# Patient Record
Sex: FEMALE | Race: WHITE | HISPANIC OR LATINO | Employment: FULL TIME | URBAN - METROPOLITAN AREA
[De-identification: names, ages, dates, MRNs, and addresses within clinical notes are randomized per-mention and may not be internally consistent; named-entity substitution may affect disease eponyms.]

---
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
## 2020-02-09 DIAGNOSIS — F32.A ANXIETY AND DEPRESSION: ICD-10-CM

## 2020-02-09 DIAGNOSIS — F41.9 ANXIETY AND DEPRESSION: ICD-10-CM

## 2020-02-19 ENCOUNTER — PATIENT MESSAGE (OUTPATIENT)
Dept: MEDICAL GROUP | Facility: MEDICAL CENTER | Age: 20
End: 2020-02-19

## 2020-03-18 ENCOUNTER — OFFICE VISIT (OUTPATIENT)
Dept: MEDICAL GROUP | Facility: MEDICAL CENTER | Age: 20
End: 2020-03-18
Payer: COMMERCIAL

## 2020-03-18 VITALS
BODY MASS INDEX: 29.86 KG/M2 | SYSTOLIC BLOOD PRESSURE: 112 MMHG | HEIGHT: 62 IN | HEART RATE: 93 BPM | OXYGEN SATURATION: 97 % | TEMPERATURE: 97.6 F | WEIGHT: 162.26 LBS | DIASTOLIC BLOOD PRESSURE: 76 MMHG

## 2020-03-18 DIAGNOSIS — F32.A ANXIETY AND DEPRESSION: ICD-10-CM

## 2020-03-18 DIAGNOSIS — F41.9 ANXIETY AND DEPRESSION: ICD-10-CM

## 2020-03-18 DIAGNOSIS — N92.1 MENORRHAGIA WITH IRREGULAR CYCLE: ICD-10-CM

## 2020-03-18 PROBLEM — F41.1 GAD (GENERALIZED ANXIETY DISORDER): Status: ACTIVE | Noted: 2017-12-18

## 2020-03-18 PROCEDURE — 99214 OFFICE O/P EST MOD 30 MIN: CPT | Performed by: NURSE PRACTITIONER

## 2020-03-18 RX ORDER — CIPROFLOXACIN AND DEXAMETHASONE 3; 1 MG/ML; MG/ML
4 SUSPENSION/ DROPS AURICULAR (OTIC)
COMMUNITY
Start: 2011-08-14 | End: 2020-03-18

## 2020-03-18 RX ORDER — NORGESTIMATE AND ETHINYL ESTRADIOL 0.25-0.035
1 KIT ORAL DAILY
Qty: 84 TAB | Refills: 3 | Status: SHIPPED | OUTPATIENT
Start: 2020-03-18 | End: 2021-02-25

## 2020-03-18 RX ORDER — AMOXICILLIN 875 MG/1
TABLET, COATED ORAL
COMMUNITY
Start: 2020-01-08 | End: 2020-03-18

## 2020-03-18 ASSESSMENT — ANXIETY QUESTIONNAIRES
7. FEELING AFRAID AS IF SOMETHING AWFUL MIGHT HAPPEN: NEARLY EVERY DAY
1. FEELING NERVOUS, ANXIOUS, OR ON EDGE: NEARLY EVERY DAY
5. BEING SO RESTLESS THAT IT IS HARD TO SIT STILL: NEARLY EVERY DAY
3. WORRYING TOO MUCH ABOUT DIFFERENT THINGS: NEARLY EVERY DAY
4. TROUBLE RELAXING: MORE THAN HALF THE DAYS
2. NOT BEING ABLE TO STOP OR CONTROL WORRYING: NEARLY EVERY DAY
GAD7 TOTAL SCORE: 19
6. BECOMING EASILY ANNOYED OR IRRITABLE: MORE THAN HALF THE DAYS

## 2020-03-18 ASSESSMENT — PATIENT HEALTH QUESTIONNAIRE - PHQ9
SUM OF ALL RESPONSES TO PHQ QUESTIONS 1-9: 13
5. POOR APPETITE OR OVEREATING: 2 - MORE THAN HALF THE DAYS
CLINICAL INTERPRETATION OF PHQ2 SCORE: 2

## 2020-03-18 ASSESSMENT — FIBROSIS 4 INDEX: FIB4 SCORE: 0.34

## 2020-03-19 ENCOUNTER — PATIENT MESSAGE (OUTPATIENT)
Dept: MEDICAL GROUP | Facility: MEDICAL CENTER | Age: 20
End: 2020-03-19

## 2020-03-19 NOTE — ASSESSMENT & PLAN NOTE
"Patient reports that overall she has had some improvement in mood and anxiety level since having started Zoloft, but still having several days a week where she is struggling with anxiety and inability to relax, feeling overwhelmed.  She has just recently increased her Zoloft dose to 100 mg daily, it will take more time for this to have full effect.  She questions if she should be switched to \"an anxiety medication\" rather than an antidepressant.  We have discussed that SSRIs are shown to work for both anxiety and depression and are the preferred course of treatment.  She does have prescription for hydroxyzine which she uses on occasion, she does find that this reduces her acute anxiety but tends to make her drowsy.  She is continuing to see her therapist.  She is currently feeling overwhelmed that her classes have now all been switched to online since the outbreak of Covid-19 virus  PRISCILA-7 Questionnaire    Feeling nervous, anxious, or on edge: Nearly every day  Not being able to sop or control worrying: Nearly every day  Worrying too much about different things: Nearly every day  Trouble relaxing: More than half the days  Being so restless that it's hard to sit still: Nearly every day  Becoming easily annoyed or irritable: More than half the days  Feeling afraid as if something awful might happen: Nearly every day  Total: 19    Interpretation of PRISCILA 7 Total Score   Score Severity :  0-4 No Anxiety   5-9 Mild Anxiety  10-14 Moderate Anxiety  15-21 Severe Anxiety    Depression Screening    Little interest or pleasure in doing things?  1 - several days   Feeling down, depressed , or hopeless? 1 - several days   Trouble falling or staying asleep, or sleeping too much?  2 - more than half the days   Feeling tired or having little energy?  1 - several days   Poor appetite or overeating?  2 - more than half the days   Feeling bad about yourself - or that you are a failure or have let yourself or your family down? 1 - " several days   Trouble concentrating on things, such as reading the newspaper or watching television? 2 - more than half the days   Moving or speaking so slowly that other people could have noticed.  Or the opposite - being so fidgety or restless that you have been moving around a lot more than usual?  3 - nearly every day   Thoughts that you would be better off dead, or of hurting yourself?  0 - not at all   Patient Health Questionnaire Score: 13       If depressive symptoms identified deferred to follow up visit unless specifically addressed in assesment and plan.    Interpretation of PHQ-9 Total Score   Score Severity   1-4 No Depression   5-9 Mild Depression   10-14 Moderate Depression   15-19 Moderately Severe Depression   20-27 Severe Depression

## 2020-03-19 NOTE — ASSESSMENT & PLAN NOTE
She has been managing with Depo-Provera over the last year, feels that she is having several side effects related to the medication and has decided to discontinue.  Her dose was due last month and she skipped this.  She felt that it was causing significant weight gain and contributing to mood swings.  She would like to try an alternative birth control for period regulation.  She is not currently sexually active.  She has has had some light spotting since having discontinued Depo-Provera.  No history of DVT, clotting disorder, hypertension.  No tobacco use

## 2020-03-19 NOTE — PROGRESS NOTES
"Subjective:     Chief Complaint   Patient presents with   • Medication Management     Pt would like to switch from zoloft to an anxiolytic     Jacquie Shawna Friedman is a 19 y.o. female here today to follow up on:    Anxiety and depression  Patient reports that overall she has had some improvement in mood and anxiety level since having started Zoloft, but still having several days a week where she is struggling with anxiety and inability to relax, feeling overwhelmed.  She has just recently increased her Zoloft dose to 100 mg daily, it will take more time for this to have full effect.  She questions if she should be switched to \"an anxiety medication\" rather than an antidepressant.  We have discussed that SSRIs are shown to work for both anxiety and depression and are the preferred course of treatment.  She does have prescription for hydroxyzine which she uses on occasion, she does find that this reduces her acute anxiety but tends to make her drowsy.  She is continuing to see her therapist.  She is currently feeling overwhelmed that her classes have now all been switched to online since the outbreak of Covid-19 virus  PRISCILA-7 Questionnaire    Feeling nervous, anxious, or on edge: Nearly every day  Not being able to sop or control worrying: Nearly every day  Worrying too much about different things: Nearly every day  Trouble relaxing: More than half the days  Being so restless that it's hard to sit still: Nearly every day  Becoming easily annoyed or irritable: More than half the days  Feeling afraid as if something awful might happen: Nearly every day  Total: 19    Interpretation of PRISCILA 7 Total Score   Score Severity :  0-4 No Anxiety   5-9 Mild Anxiety  10-14 Moderate Anxiety  15-21 Severe Anxiety    Depression Screening    Little interest or pleasure in doing things?  1 - several days   Feeling down, depressed , or hopeless? 1 - several days   Trouble falling or staying asleep, or sleeping too much?  2 - more than " half the days   Feeling tired or having little energy?  1 - several days   Poor appetite or overeating?  2 - more than half the days   Feeling bad about yourself - or that you are a failure or have let yourself or your family down? 1 - several days   Trouble concentrating on things, such as reading the newspaper or watching television? 2 - more than half the days   Moving or speaking so slowly that other people could have noticed.  Or the opposite - being so fidgety or restless that you have been moving around a lot more than usual?  3 - nearly every day   Thoughts that you would be better off dead, or of hurting yourself?  0 - not at all   Patient Health Questionnaire Score: 13       If depressive symptoms identified deferred to follow up visit unless specifically addressed in assesment and plan.    Interpretation of PHQ-9 Total Score   Score Severity   1-4 No Depression   5-9 Mild Depression   10-14 Moderate Depression   15-19 Moderately Severe Depression   20-27 Severe Depression        Menorrhagia with irregular cycle  She has been managing with Depo-Provera over the last year, feels that she is having several side effects related to the medication and has decided to discontinue.  Her dose was due last month and she skipped this.  She felt that it was causing significant weight gain and contributing to mood swings.  She would like to try an alternative birth control for period regulation.  She is not currently sexually active.  She has has had some light spotting since having discontinued Depo-Provera.  No history of DVT, clotting disorder, hypertension.  No tobacco use       Current medicines (including changes today)  Current Outpatient Medications   Medication Sig Dispense Refill   • norgestimate-ethinyl estradiol (ORTHO-CYCLEN) 0.25-35 MG-MCG per tablet Take 1 Tab by mouth every day. 84 Tab 3   • sertraline (ZOLOFT) 50 MG Tab TAKE 1 TABLET BY MOUTH ONCE DAILY 90 Tab 1   • hydrOXYzine HCl (ATARAX) 25 MG Tab  "Take 1 Tab by mouth every 8 hours as needed for Anxiety (No more than 2 tabs in 24 hrs). 30 Tab 0     No current facility-administered medications for this visit.      She  has a past medical history of Ankle fracture, right and Healthy pediatric patient.    ROS included above     Objective:     /76   Pulse 93   Temp 36.4 °C (97.6 °F)   Ht 1.575 m (5' 2\")   Wt 73.6 kg (162 lb 4.1 oz)   SpO2 97%  Body mass index is 29.68 kg/m².     Physical Exam:  General: Alert, oriented in no acute distress.  Eye contact is good, speech is normal, affect calm  Lungs: clear to auscultation bilaterally, normal effort, no wheeze/ rhonchi/ rales.  CV: regular rate and rhythm, S1, S2, no murmur  Abdomen: soft, nontender  Ext: no edema, color normal, vascularity normal, temperature normal    Assessment and Plan:   The following treatment plan was discussed  1. Anxiety and depression   uncontrolled.  She has recently increased her dose of Zoloft which may take another 1 to 2 weeks to have full effect.  We have discussed that SSRIs are preferred treatment for anxiety.  She will continue with current plan and let me know how she is feeling an additional week or 2 at which point we can further adjust if needed.  Lifestyle factors including regular exercise, healthy diet, good sleep patterns reviewed.  Encouraged to continue with counseling   2. Menorrhagia with irregular cycle   she has discontinued Depo-Provera and would like to switch to oral contraceptive.  We will switch to Ortho-Cyclen  We discussed that birth control pills can increase risk of blood clots, liver tumors, gallbladder disease, and stroke. Side effects can include headache, constipation nausea. They do not protect against STDs. They are not effective if not taken everyday, if more than one pill is missed a backup method should be used. Antibiotics can make the pill less effective, she takes antibiotic a backup method should be used. Any unusual headache leg pain " chest pain shortness of breath difficulty speaking or moving should be addressed immediately.           Followup: 1-2 weeks regarding mood         Please note that this dictation was created using voice recognition software. I have worked with consultants from the vendor as well as technical experts from [employer] to optimize the interface. I have made every reasonable attempt to correct obvious errors, but I expect that there are errors of grammar and possibly content that I did not discover before finalizing the note.

## 2020-03-31 ENCOUNTER — PATIENT MESSAGE (OUTPATIENT)
Dept: MEDICAL GROUP | Facility: MEDICAL CENTER | Age: 20
End: 2020-03-31

## 2020-04-01 NOTE — TELEPHONE ENCOUNTER
From: Jacquie Friedman  To: JORDY De  Sent: 3/31/2020 3:49 PM PDT  Subject: checking in    I am taking the birth control and the Zoloft after dinner so around 7.       ----- Message -----   From:JORDY De   Sent:3/31/2020 1:34 PM PDT   To:Jacquie Friedman   Subject:RE: checking in    Could be related to either of the medications. Be sure that you are taking it with food in your stomach. Are you doing the birth control in the morning or evening? Some people do better taking it before bed.      ----- Message -----   From:Jacquie Friedman   Sent:3/31/2020 1:22 PM PDT   To:JORDY De   Subject:RE: checking in    Hello,  I feel better and I think this is the right dosage for me right now. Everything is going well. The only difference that I have noticed is that I have been getting nauseous very easily which I think might be due to the birth control. Any input?   Thank you       ----- Message -----   From:JORDY De   Sent:3/31/2020 1:05 PM PDT   To:Jacquie Friedman   Subject:checking in    [facility],  I just wanted to check in with you and see you had your medications working for the anxiety? Have you noticed any additional benefit in the last week or 2?  Mariza JUDD

## 2020-04-03 ENCOUNTER — PATIENT MESSAGE (OUTPATIENT)
Dept: MEDICAL GROUP | Facility: MEDICAL CENTER | Age: 20
End: 2020-04-03

## 2020-04-05 RX ORDER — SERTRALINE HYDROCHLORIDE 100 MG/1
100 TABLET, FILM COATED ORAL DAILY
Qty: 30 TAB | Refills: 11 | Status: SHIPPED | OUTPATIENT
Start: 2020-04-05 | End: 2021-02-02

## 2020-04-05 NOTE — TELEPHONE ENCOUNTER
From: Jacquie Friedman  To: JORDY De  Sent: 4/3/2020 10:09 PM PDT  Subject: Prescription Question    Hello,   Can you send in a prescription for the Zoloft for 100mg? I have a few left of the current 50mg.   Thank you

## 2020-04-09 ENCOUNTER — PATIENT MESSAGE (OUTPATIENT)
Dept: MEDICAL GROUP | Facility: MEDICAL CENTER | Age: 20
End: 2020-04-09

## 2020-04-09 RX ORDER — AZELASTINE HYDROCHLORIDE 0.5 MG/ML
1 SOLUTION/ DROPS OPHTHALMIC 2 TIMES DAILY
Qty: 6 ML | Refills: 2 | Status: SHIPPED | OUTPATIENT
Start: 2020-04-09 | End: 2021-04-01

## 2020-07-21 ENCOUNTER — PATIENT MESSAGE (OUTPATIENT)
Dept: MEDICAL GROUP | Facility: MEDICAL CENTER | Age: 20
End: 2020-07-21

## 2020-07-22 ENCOUNTER — PATIENT MESSAGE (OUTPATIENT)
Dept: MEDICAL GROUP | Facility: MEDICAL CENTER | Age: 20
End: 2020-07-22

## 2020-07-22 NOTE — TELEPHONE ENCOUNTER
From: Jacquie Friedman  To: JORDY De  Sent: 7/21/2020 8:29 PM PDT  Subject: Non-Urgent Medical Question    Hello,  I was wondering what the soonest time would be that I could come in for an appointment. I've hit a really low point in my life and I talked to my therapist and we were thinking that maybe the best option for me is to switch medications and try something new.   Thank you

## 2020-07-22 NOTE — TELEPHONE ENCOUNTER
From: Jacquie Friedman  To: JORDY De  Sent: 7/22/2020 8:10 AM PDT  Subject: Non-Urgent Medical Question    Hi,   If I can come in at 4 that would be great. Thank you.       ----- Message -----   From:JORDY De   Sent:7/22/2020 7:24 AM PDT   To:Jacquie Friedman   Subject:RE: Non-Urgent Medical Question    Hi Jacquie, I have time to see this afternoon if you can make it in. Or we can do it by virtual (video) call. Either 240 or 4:00 works for me. Let me know.      ----- Message -----   From:Jacquie Friedman   Sent:7/21/2020 8:29 PM PDT   To:JORDY De   Subject:Non-Urgent Medical Question    Hello,  I was wondering what the soonest time would be that I could come in for an appointment. I've hit a really low point in my life and I talked to my therapist and we were thinking that maybe the best option for me is to switch medications and try something new.   Thank you

## 2020-12-17 ENCOUNTER — PATIENT MESSAGE (OUTPATIENT)
Dept: MEDICAL GROUP | Facility: MEDICAL CENTER | Age: 20
End: 2020-12-17

## 2020-12-17 DIAGNOSIS — B34.9 VIRAL ILLNESS: ICD-10-CM

## 2020-12-19 ENCOUNTER — HOSPITAL ENCOUNTER (OUTPATIENT)
Dept: LAB | Facility: MEDICAL CENTER | Age: 20
End: 2020-12-19
Attending: NURSE PRACTITIONER
Payer: COMMERCIAL

## 2020-12-19 PROCEDURE — U0003 INFECTIOUS AGENT DETECTION BY NUCLEIC ACID (DNA OR RNA); SEVERE ACUTE RESPIRATORY SYNDROME CORONAVIRUS 2 (SARS-COV-2) (CORONAVIRUS DISEASE [COVID-19]), AMPLIFIED PROBE TECHNIQUE, MAKING USE OF HIGH THROUGHPUT TECHNOLOGIES AS DESCRIBED BY CMS-2020-01-R: HCPCS

## 2020-12-19 PROCEDURE — C9803 HOPD COVID-19 SPEC COLLECT: HCPCS

## 2020-12-20 LAB
COVID ORDER STATUS COVID19: NORMAL
SARS-COV-2 RNA RESP QL NAA+PROBE: NOTDETECTED
SPECIMEN SOURCE: NORMAL

## 2020-12-21 ENCOUNTER — PATIENT MESSAGE (OUTPATIENT)
Dept: MEDICAL GROUP | Facility: MEDICAL CENTER | Age: 20
End: 2020-12-21

## 2021-02-02 ENCOUNTER — PATIENT MESSAGE (OUTPATIENT)
Dept: MEDICAL GROUP | Facility: MEDICAL CENTER | Age: 21
End: 2021-02-02

## 2021-02-02 RX ORDER — FLUOXETINE HYDROCHLORIDE 20 MG/1
20 CAPSULE ORAL DAILY
Qty: 30 CAP | Refills: 5 | Status: SHIPPED | OUTPATIENT
Start: 2021-02-02 | End: 2021-08-09

## 2021-02-03 ENCOUNTER — PATIENT MESSAGE (OUTPATIENT)
Dept: MEDICAL GROUP | Facility: MEDICAL CENTER | Age: 21
End: 2021-02-03

## 2021-02-03 NOTE — TELEPHONE ENCOUNTER
From: Jacquie Friedman  To: JORDY De  Sent: 2/3/2021 7:48 AM PST  Subject: Prescription Question    Yes it's working really well!       ----- Message -----   From:JORDY De   Sent:2/2/2021 7:58 PM PST   To:Jacquie Friedman   Subject:RE: Prescription Question    Yes, I can refill this for you. Is it working well?  Mariza JUDD      ----- Message -----   From:Jacquie Friedman   Sent:2/2/2021 6:07 PM PST   To:JORDY De   Subject:Prescription Question    Hello,     I went to a psychiatrist and he prescribed me Prozac (Fluoxetine) 20mg. However he left the office where I would go and I'm low on my medicine. Would you be able to send in a prescription to the pharmacy please?     Thank you,   Jacquie Friedman

## 2021-02-03 NOTE — TELEPHONE ENCOUNTER
From: Jacquie Friedman  To: JORDY De  Sent: 2/2/2021 6:07 PM PST  Subject: Prescription Question    Hello,     I went to a psychiatrist and he prescribed me Prozac (Fluoxetine) 20mg. However he left the office where I would go and I'm low on my medicine. Would you be able to send in a prescription to the pharmacy please?     Thank you,   Jacquie Friedman

## 2021-02-24 ENCOUNTER — PATIENT MESSAGE (OUTPATIENT)
Dept: MEDICAL GROUP | Facility: MEDICAL CENTER | Age: 21
End: 2021-02-24

## 2021-02-25 ENCOUNTER — PATIENT MESSAGE (OUTPATIENT)
Dept: MEDICAL GROUP | Facility: MEDICAL CENTER | Age: 21
End: 2021-02-25

## 2021-02-25 ENCOUNTER — OFFICE VISIT (OUTPATIENT)
Dept: MEDICAL GROUP | Facility: MEDICAL CENTER | Age: 21
End: 2021-02-25
Payer: COMMERCIAL

## 2021-02-25 VITALS
SYSTOLIC BLOOD PRESSURE: 110 MMHG | WEIGHT: 148.59 LBS | DIASTOLIC BLOOD PRESSURE: 70 MMHG | HEIGHT: 62 IN | TEMPERATURE: 98.4 F | HEART RATE: 77 BPM | BODY MASS INDEX: 27.34 KG/M2 | OXYGEN SATURATION: 95 % | RESPIRATION RATE: 20 BRPM

## 2021-02-25 DIAGNOSIS — B34.9 VIRAL ILLNESS: ICD-10-CM

## 2021-02-25 DIAGNOSIS — J02.9 SORE THROAT: ICD-10-CM

## 2021-02-25 DIAGNOSIS — J02.9 EXUDATIVE PHARYNGITIS: ICD-10-CM

## 2021-02-25 LAB
INT CON NEG: NEGATIVE
INT CON POS: POSITIVE
S PYO AG THROAT QL: NORMAL

## 2021-02-25 PROCEDURE — 99213 OFFICE O/P EST LOW 20 MIN: CPT | Performed by: NURSE PRACTITIONER

## 2021-02-25 PROCEDURE — 87880 STREP A ASSAY W/OPTIC: CPT | Performed by: NURSE PRACTITIONER

## 2021-02-25 RX ORDER — AMOXICILLIN 500 MG/1
1000 CAPSULE ORAL DAILY
Qty: 20 CAPSULE | Refills: 0 | Status: SHIPPED | OUTPATIENT
Start: 2021-02-25 | End: 2021-04-01

## 2021-02-25 NOTE — PROGRESS NOTES
"Subjective:     Chief Complaint   Patient presents with   • Advice Only     inflammed tonsil (L) since yest     Jacquie Friedman is a 20 y.o. female here for evaluation of sore throat.  This started 2 days ago, primarily on the left side.  She has had a white patch visible on her tonsil and feels that her lymph nodes are swollen on the site as well.  She has some pressure in her ears bilaterally.  She has recently been around a friend who was diagnosed with strep pharyngitis.  She has been taking Tylenol and doing salt water gargles at home with minimal relief  No fever, chills, congestion, rash, nausea.  No known exposure to COVID-19.  No cough        Current medicines (including changes today)  Current Outpatient Medications   Medication Sig Dispense Refill   • amoxicillin (AMOXIL) 500 MG Cap Take 2 Capsules by mouth every day. 20 capsule 0   • FLUoxetine (PROZAC) 20 MG Cap Take 1 Cap by mouth every day. 30 Cap 5   • azelastine (OPTIVAR) 0.05 % ophthalmic solution Place 1 Drop in both eyes 2 times a day. 6 mL 2   • hydrOXYzine HCl (ATARAX) 25 MG Tab Take 1 Tab by mouth every 8 hours as needed for Anxiety (No more than 2 tabs in 24 hrs). 30 Tab 0     No current facility-administered medications for this visit.     She  has a past medical history of Ankle fracture, right and Healthy pediatric patient.    ROS included above     Objective:     /70 (BP Location: Left arm, Patient Position: Sitting, BP Cuff Size: Adult)   Pulse 77   Temp 36.9 °C (98.4 °F)   Resp 20   Ht 1.575 m (5' 2\")   Wt 67.4 kg (148 lb 9.4 oz)   SpO2 95%  Body mass index is 27.18 kg/m².     Physical Exam:  General: Alert, oriented in no acute distress.  Eye contact is good, speech is normal, affect calm  HEENT: Posterior oropharynx with erythema, exudate present, tonsil moderately enlarged primarily on the left.  Mild tonsillar lymphadenopathy, mobile, nontender.  Nares clear  Lungs: clear to auscultation bilaterally, normal effort, " no wheeze/ rhonchi/ rales.  CV: regular rate and rhythm, S1, S2, no murmur  Ext: no edema, color normal, vascularity normal, temperature normal    Assessment and Plan:   The following treatment plan was discussed   1. Exudative pharyngitis   rapid strep is negative in the office today but she does have visible exudate and I am concerned this may be a false reading.  Ideally I would like to send a throat culture but I do not have a swab available at this time.  I will go ahead and treat her with amoxicillin.  Advised to complete entire antibiotic course.  We have discussed that antibiotics can cause diarrhea and reviewed dietary recommendations for this.  May continue with Tylenol or ibuprofen as well as salt water gargles as needed.  Follow-up if not gradually resolving   2. Sore throat  POCT Rapid Strep A    amoxicillin (AMOXIL) 500 MG Cap       Followup: as needed    Please note that this dictation was created using voice recognition software. I have worked with consultants from the vendor as well as technical experts from [facility] to optimize the interface. I have made every reasonable attempt to correct obvious errors, but I expect that there are errors of grammar and possibly content that I did not discover before finalizing the note.

## 2021-02-26 ENCOUNTER — HOSPITAL ENCOUNTER (OUTPATIENT)
Dept: LAB | Facility: MEDICAL CENTER | Age: 21
End: 2021-02-26
Attending: NURSE PRACTITIONER
Payer: COMMERCIAL

## 2021-02-26 DIAGNOSIS — B34.9 VIRAL ILLNESS: ICD-10-CM

## 2021-02-26 PROCEDURE — U0005 INFEC AGEN DETEC AMPLI PROBE: HCPCS

## 2021-02-26 PROCEDURE — C9803 HOPD COVID-19 SPEC COLLECT: HCPCS

## 2021-02-26 PROCEDURE — U0003 INFECTIOUS AGENT DETECTION BY NUCLEIC ACID (DNA OR RNA); SEVERE ACUTE RESPIRATORY SYNDROME CORONAVIRUS 2 (SARS-COV-2) (CORONAVIRUS DISEASE [COVID-19]), AMPLIFIED PROBE TECHNIQUE, MAKING USE OF HIGH THROUGHPUT TECHNOLOGIES AS DESCRIBED BY CMS-2020-01-R: HCPCS

## 2021-03-22 ENCOUNTER — OFFICE VISIT (OUTPATIENT)
Dept: MEDICAL GROUP | Facility: MEDICAL CENTER | Age: 21
End: 2021-03-22
Payer: COMMERCIAL

## 2021-03-22 VITALS
TEMPERATURE: 98.3 F | HEART RATE: 93 BPM | HEIGHT: 62 IN | DIASTOLIC BLOOD PRESSURE: 78 MMHG | BODY MASS INDEX: 27.23 KG/M2 | RESPIRATION RATE: 20 BRPM | SYSTOLIC BLOOD PRESSURE: 118 MMHG | OXYGEN SATURATION: 93 % | WEIGHT: 148 LBS

## 2021-03-22 DIAGNOSIS — Z13.0 SCREENING, ANEMIA, DEFICIENCY, IRON: ICD-10-CM

## 2021-03-22 DIAGNOSIS — Z13.29 THYROID DISORDER SCREENING: ICD-10-CM

## 2021-03-22 DIAGNOSIS — Z13.220 LIPID SCREENING: ICD-10-CM

## 2021-03-22 DIAGNOSIS — Z11.1 SCREENING-PULMONARY TB: ICD-10-CM

## 2021-03-22 DIAGNOSIS — F32.1 MODERATE SINGLE CURRENT EPISODE OF MAJOR DEPRESSIVE DISORDER (HCC): ICD-10-CM

## 2021-03-22 DIAGNOSIS — Z13.21 ENCOUNTER FOR VITAMIN DEFICIENCY SCREENING: ICD-10-CM

## 2021-03-22 DIAGNOSIS — Z00.00 ANNUAL PHYSICAL EXAM: ICD-10-CM

## 2021-03-22 DIAGNOSIS — Z23 NEED FOR VACCINATION: ICD-10-CM

## 2021-03-22 PROCEDURE — 90715 TDAP VACCINE 7 YRS/> IM: CPT | Performed by: NURSE PRACTITIONER

## 2021-03-22 PROCEDURE — 90471 IMMUNIZATION ADMIN: CPT | Performed by: NURSE PRACTITIONER

## 2021-03-22 PROCEDURE — 99395 PREV VISIT EST AGE 18-39: CPT | Mod: 25 | Performed by: NURSE PRACTITIONER

## 2021-03-22 ASSESSMENT — LIFESTYLE VARIABLES
DURING THE PAST 12 MONTHS, ON HOW MANY DAYS DID YOU USE ANYTHING ELSE TO GET HIGH: 0
PART A TOTAL SCORE: 0
HAVE YOU EVER RIDDEN IN A CAR DRIVEN BY SOMEONE WHO WAS HIGH OR HAD BEEN USING ALCOHOL OR DRUGS: NO
DURING THE PAST 12 MONTHS, ON HOW MANY DAYS DID YOU USE ANY TOBACCO OR NICOTINE PRODUCTS: 0
DURING THE PAST 12 MONTHS, ON HOW MANY DAYS DID YOU USE ANY MARIJUANA: 0
DURING THE PAST 12 MONTHS, ON HOW MANY DAYS DID YOU DRINK MORE THAN A FEW SIPS OF BEER, WINE, OR ANY DRINK CONTAINING ALCOHOL: 0

## 2021-03-22 ASSESSMENT — ANXIETY QUESTIONNAIRES
GAD7 TOTAL SCORE: 6
5. BEING SO RESTLESS THAT IT IS HARD TO SIT STILL: SEVERAL DAYS
6. BECOMING EASILY ANNOYED OR IRRITABLE: NOT AT ALL
4. TROUBLE RELAXING: SEVERAL DAYS
3. WORRYING TOO MUCH ABOUT DIFFERENT THINGS: SEVERAL DAYS
1. FEELING NERVOUS, ANXIOUS, OR ON EDGE: SEVERAL DAYS
2. NOT BEING ABLE TO STOP OR CONTROL WORRYING: SEVERAL DAYS
7. FEELING AFRAID AS IF SOMETHING AWFUL MIGHT HAPPEN: SEVERAL DAYS

## 2021-03-22 ASSESSMENT — PATIENT HEALTH QUESTIONNAIRE - PHQ9: CLINICAL INTERPRETATION OF PHQ2 SCORE: 0

## 2021-03-22 NOTE — NON-PROVIDER
"Jacquie is a 20 year old female here for her annual and the following needs:      Annual physical exam  Jacquie is a 20 year old female her for her annual exam  She is doing well. Follows a healthy diet and exercises at least 4x/week  She does not smoke or drink alcohol  She is sexually active with a male partner for a few months.   No concerns with STDs at this time. Her and her partner got tested prior to starting intercourse.   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   She feels this dosage is appropriate in controlling her symptoms   She expressed feeling \"great\" since taking it  Denies any SE with this medication    Depression Screening  Little interest or pleasure in doing things?  0 - not at all  Feeling down, depressed , or hopeless? 0 - not at all  Patient Health Questionnaire Score: 0    Interpretation of PHQ-9 Total Score   Score Severity   1-4 Minimal Depression   5-9 Mild Depression   10-14 Moderate Depression   15-19 Moderately Severe Depression   20-27 Severe Depression    PRISCILA-7 Questionnaire    Feeling nervous, anxious, or on edge: Several days  Not being able to sop or control worrying: Several days  Worrying too much about different things: Several days  Trouble relaxing: Several days  Being so restless that it's hard to sit still: Several days  Becoming easily annoyed or irritable: Not at all  Feeling afraid as if something awful might happen: Several days  Total: 6    Interpretation of PRISCILA 7 Total Score   Score Severity :  0-4 No Anxiety   5-9 Mild Anxiety  10-14 Moderate Anxiety  15-21 Severe Anxiety      1. Annual physical exam  I have placed the following orders for screening including the Quantiferon for nursing school.   - TSH WITH REFLEX TO FT4; Future  - CBC WITH " DIFFERENTIAL; Future  - Quantiferon Gold TB (PPD); Future  - Comp Metabolic Panel; Future  - Lipid Profile; Future  - VITAMIN D,25 HYDROXY; Future    Moderate single current episode of major depressive disorder (HCC):  Taking Prozac 20 mg. No change on medication today.   Pt expressed feeling well and her symptoms are well controlled with this dosage.     2. Thyroid disorder screening  I have placed the following order for screening.   - TSH WITH REFLEX TO FT4; Future    3. Encounter for vitamin deficiency screening  I have placed the following order for screening  - VITAMIN D,25 HYDROXY; Future    4. Screening-pulmonary TB  I have placed the following order for screening  - Quantiferon Gold TB (PPD); Future    5. Lipid screening  I have placed the following order for screening   - Lipid Profile; Future    6. Screening, anemia, deficiency, iron  I have placed the following order for screening  - CBC WITH DIFFERENTIAL; Future    7. Need for vaccination  I have placed the following order for screening   - Tdap =>8yo IM

## 2021-03-22 NOTE — PROGRESS NOTES
"Subjective:     Chief Complaint   Patient presents with   • Annual Exam     vaccines     Jacquie is a 20 year old female her for her annual exam.  Patient is seen today by myself and APRN student, Bryn Larsen  She is doing well. Follows a healthy diet and exercises at least 4x/week  She does not smoke or drink alcohol  She is sexually active with a male partner for a few months.   No concerns with STDs at this time. Her and her partner got tested prior to starting intercourse.   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   She feels this dosage is appropriate in controlling her symptoms   She expressed feeling \"great\" since taking it  Denies any SE with this medication     Depression Screening  Little interest or pleasure in doing things?  0 - not at all  Feeling down, depressed , or hopeless? 0 - not at all  Patient Health Questionnaire Score: 0     Interpretation of PHQ-9 Total Score   Score Severity   1-4 Minimal Depression   5-9 Mild Depression   10-14 Moderate Depression   15-19 Moderately Severe Depression   20-27 Severe Depression     PRISCILA-7 Questionnaire     Feeling nervous, anxious, or on edge: Several days  Not being able to sop or control worrying: Several days  Worrying too much about different things: Several days  Trouble relaxing: Several days  Being so restless that it's hard to sit still: Several days  Becoming easily annoyed or irritable: Not at all  Feeling afraid as if something awful might happen: Several days  Total: 6     Interpretation of PRISCILA 7 Total Score   Score Severity :  0-4 No Anxiety   5-9 Mild Anxiety  10-14 Moderate Anxiety  15-21 Severe Anxiety       Current medicines (including changes today)  Current Outpatient Medications   Medication Sig Dispense " "Refill   • amoxicillin (AMOXIL) 500 MG Cap Take 2 Capsules by mouth every day. 20 capsule 0   • FLUoxetine (PROZAC) 20 MG Cap Take 1 Cap by mouth every day. 30 Cap 5   • azelastine (OPTIVAR) 0.05 % ophthalmic solution Place 1 Drop in both eyes 2 times a day. 6 mL 2   • hydrOXYzine HCl (ATARAX) 25 MG Tab Take 1 Tab by mouth every 8 hours as needed for Anxiety (No more than 2 tabs in 24 hrs). 30 Tab 0     No current facility-administered medications for this visit.     She  has a past medical history of Ankle fracture, right and Healthy pediatric patient.    ROS included above     Objective:     /78 (BP Location: Right arm, Patient Position: Sitting, BP Cuff Size: Adult)   Pulse 93   Temp 36.8 °C (98.3 °F) (Temporal)   Resp 20   Ht 1.575 m (5' 2\")   Wt 67.1 kg (148 lb)   SpO2 93%  Body mass index is 27.07 kg/m².     Physical Exam:  General: Alert, oriented in no acute distress.  Eye contact is good, speech is normal, affect calm  HEENT: Oral mucosa pink moist, no lesions. TMs gray with good landmarks bilaterally. No lymphadenopathy.  Lungs: clear to auscultation bilaterally, normal effort, no wheeze/ rhonchi/ rales.  CV: regular rate and rhythm, S1, S2, no murmur  Abdomen: soft, nontender, no hepatosplenomegaly  Ext: no edema, color normal, vascularity normal, temperature normal    Assessment and Plan:   The following treatment plan was discussed   1. Annual physical exam  Normal physical exam. General health and wellness discussion including healthy diet, regular exercise. 2000 iu Vit d3 advised daily.  Advised regular dental cleanings, eye exam yearly.  I have placed the following orders for screening including the Quantiferon for nursing school.   - TSH WITH REFLEX TO FT4; Future  - CBC WITH DIFFERENTIAL; Future  - Quantiferon Gold TB (PPD); Future  - Comp Metabolic Panel; Future  - Lipid Profile; Future  - VITAMIN D,25 HYDROXY; Future     Moderate single current episode of major depressive disorder " (HCC):  Taking Prozac 20 mg. No change on medication today.   Pt expressed feeling well and her symptoms are well controlled with this dosage.      2. Thyroid disorder screening  I have placed the following order for screening.   - TSH WITH REFLEX TO FT4; Future     3. Encounter for vitamin deficiency screening  I have placed the following order for screening  - VITAMIN D,25 HYDROXY; Future     4. Screening-pulmonary TB  I have placed the following order for screening  - Quantiferon Gold TB (PPD); Future     5. Lipid screening  I have placed the following order for screening  - Lipid Profile; Future     6. Screening, anemia, deficiency, iron  I have placed the following order for screening  - CBC WITH DIFFERENTIAL; Future     7. Need for vaccination  I have placed the below orders and discussed them with an approved delegating provider. The MA is performing the below orders under the direction of Dr. Perry  - Tdap =>8yo IM     Followup: pending labs         Please note that this dictation was created using voice recognition software. I have worked with consultants from the vendor as well as technical experts from [facility] to optimize the interface. I have made every reasonable attempt to correct obvious errors, but I expect that there are errors of grammar and possibly content that I did not discover before finalizing the note.

## 2021-03-22 NOTE — ASSESSMENT & PLAN NOTE
"Controlled with Prozac 20mg daily.   She feels this dosage is appropriate in controlling her symptoms   She expressed feeling \"great\" since taking it  Denies any SE with this medication     Depression Screening  Little interest or pleasure in doing things?  0 - not at all  Feeling down, depressed , or hopeless? 0 - not at all  Patient Health Questionnaire Score: 0     Interpretation of PHQ-9 Total Score   Score Severity   1-4 Minimal Depression   5-9 Mild Depression   10-14 Moderate Depression   15-19 Moderately Severe Depression   20-27 Severe Depression     PRISCILA-7 Questionnaire     Feeling nervous, anxious, or on edge: Several days  Not being able to sop or control worrying: Several days  Worrying too much about different things: Several days  Trouble relaxing: Several days  Being so restless that it's hard to sit still: Several days  Becoming easily annoyed or irritable: Not at all  Feeling afraid as if something awful might happen: Several days  Total: 6     Interpretation of PRISCILA 7 Total Score   Score Severity :  0-4 No Anxiety   5-9 Mild Anxiety  10-14 Moderate Anxiety  15-21 Severe Anxiety  "

## 2021-03-23 ENCOUNTER — HOSPITAL ENCOUNTER (OUTPATIENT)
Dept: LAB | Facility: MEDICAL CENTER | Age: 21
End: 2021-03-23
Attending: NURSE PRACTITIONER
Payer: COMMERCIAL

## 2021-03-23 DIAGNOSIS — Z13.220 LIPID SCREENING: ICD-10-CM

## 2021-03-23 DIAGNOSIS — Z13.29 THYROID DISORDER SCREENING: ICD-10-CM

## 2021-03-23 DIAGNOSIS — Z11.1 SCREENING-PULMONARY TB: ICD-10-CM

## 2021-03-23 DIAGNOSIS — Z13.21 ENCOUNTER FOR VITAMIN DEFICIENCY SCREENING: ICD-10-CM

## 2021-03-23 DIAGNOSIS — Z13.0 SCREENING, ANEMIA, DEFICIENCY, IRON: ICD-10-CM

## 2021-03-23 DIAGNOSIS — Z00.00 ANNUAL PHYSICAL EXAM: ICD-10-CM

## 2021-03-23 LAB
ALBUMIN SERPL BCP-MCNC: 4.1 G/DL (ref 3.2–4.9)
ALBUMIN/GLOB SERPL: 1.2 G/DL
ALP SERPL-CCNC: 102 U/L (ref 30–99)
ALT SERPL-CCNC: 19 U/L (ref 2–50)
ANION GAP SERPL CALC-SCNC: 10 MMOL/L (ref 7–16)
AST SERPL-CCNC: 22 U/L (ref 12–45)
BASOPHILS # BLD AUTO: 0.6 % (ref 0–1.8)
BASOPHILS # BLD: 0.04 K/UL (ref 0–0.12)
BILIRUB SERPL-MCNC: 0.5 MG/DL (ref 0.1–1.5)
BUN SERPL-MCNC: 10 MG/DL (ref 8–22)
CALCIUM SERPL-MCNC: 9.2 MG/DL (ref 8.5–10.5)
CHLORIDE SERPL-SCNC: 103 MMOL/L (ref 96–112)
CHOLEST SERPL-MCNC: 153 MG/DL (ref 100–199)
CO2 SERPL-SCNC: 24 MMOL/L (ref 20–33)
CREAT SERPL-MCNC: 0.64 MG/DL (ref 0.5–1.4)
EOSINOPHIL # BLD AUTO: 0.31 K/UL (ref 0–0.51)
EOSINOPHIL NFR BLD: 4.6 % (ref 0–6.9)
ERYTHROCYTE [DISTWIDTH] IN BLOOD BY AUTOMATED COUNT: 45.1 FL (ref 35.9–50)
FASTING STATUS PATIENT QL REPORTED: NORMAL
GLOBULIN SER CALC-MCNC: 3.4 G/DL (ref 1.9–3.5)
GLUCOSE SERPL-MCNC: 85 MG/DL (ref 65–99)
HCT VFR BLD AUTO: 42.6 % (ref 37–47)
HDLC SERPL-MCNC: 75 MG/DL
HGB BLD-MCNC: 14.2 G/DL (ref 12–16)
IMM GRANULOCYTES # BLD AUTO: 0.01 K/UL (ref 0–0.11)
IMM GRANULOCYTES NFR BLD AUTO: 0.1 % (ref 0–0.9)
LDLC SERPL CALC-MCNC: 66 MG/DL
LYMPHOCYTES # BLD AUTO: 1.73 K/UL (ref 1–4.8)
LYMPHOCYTES NFR BLD: 25.9 % (ref 22–41)
MCH RBC QN AUTO: 32.2 PG (ref 27–33)
MCHC RBC AUTO-ENTMCNC: 33.3 G/DL (ref 33.6–35)
MCV RBC AUTO: 96.6 FL (ref 81.4–97.8)
MONOCYTES # BLD AUTO: 0.66 K/UL (ref 0–0.85)
MONOCYTES NFR BLD AUTO: 9.9 % (ref 0–13.4)
NEUTROPHILS # BLD AUTO: 3.93 K/UL (ref 2–7.15)
NEUTROPHILS NFR BLD: 58.9 % (ref 44–72)
NRBC # BLD AUTO: 0 K/UL
NRBC BLD-RTO: 0 /100 WBC
PLATELET # BLD AUTO: 257 K/UL (ref 164–446)
PMV BLD AUTO: 10.6 FL (ref 9–12.9)
POTASSIUM SERPL-SCNC: 4.3 MMOL/L (ref 3.6–5.5)
PROT SERPL-MCNC: 7.5 G/DL (ref 6–8.2)
RBC # BLD AUTO: 4.41 M/UL (ref 4.2–5.4)
SODIUM SERPL-SCNC: 137 MMOL/L (ref 135–145)
TRIGL SERPL-MCNC: 58 MG/DL (ref 0–149)
WBC # BLD AUTO: 6.7 K/UL (ref 4.8–10.8)

## 2021-03-23 PROCEDURE — 84443 ASSAY THYROID STIM HORMONE: CPT

## 2021-03-23 PROCEDURE — 36415 COLL VENOUS BLD VENIPUNCTURE: CPT

## 2021-03-23 PROCEDURE — 86480 TB TEST CELL IMMUN MEASURE: CPT

## 2021-03-23 PROCEDURE — 82306 VITAMIN D 25 HYDROXY: CPT

## 2021-03-23 PROCEDURE — 80053 COMPREHEN METABOLIC PANEL: CPT

## 2021-03-23 PROCEDURE — 84439 ASSAY OF FREE THYROXINE: CPT

## 2021-03-23 PROCEDURE — 85025 COMPLETE CBC W/AUTO DIFF WBC: CPT

## 2021-03-23 PROCEDURE — 80061 LIPID PANEL: CPT

## 2021-03-24 LAB
25(OH)D3 SERPL-MCNC: 25 NG/ML (ref 30–100)
GAMMA INTERFERON BACKGROUND BLD IA-ACNC: 0.05 IU/ML
M TB IFN-G BLD-IMP: NEGATIVE
M TB IFN-G CD4+ BCKGRND COR BLD-ACNC: 0.03 IU/ML
MITOGEN IGNF BCKGRD COR BLD-ACNC: >10 IU/ML
QFT TB2 - NIL TBQ2: 0.01 IU/ML
T4 FREE SERPL-MCNC: 1.1 NG/DL (ref 0.93–1.7)
TSH SERPL DL<=0.005 MIU/L-ACNC: 0.32 UIU/ML (ref 0.38–5.33)

## 2021-03-26 ENCOUNTER — TELEPHONE (OUTPATIENT)
Dept: MEDICAL GROUP | Facility: MEDICAL CENTER | Age: 21
End: 2021-03-26

## 2021-03-26 NOTE — TELEPHONE ENCOUNTER
Phone Number Called: [phone] (home)       Call outcome: Spoke to patient regarding message below.    Message: Relayed message to pt and helped her schedule an apt with  to follow up. Patient also saw results on her mychart. No further questions at this time. LUC

## 2021-03-26 NOTE — TELEPHONE ENCOUNTER
----- Message from Lico Kong P.A.-C. sent at 3/26/2021  8:33 AM PDT -----  Please contact Jacquie.  I am filling in for Mariza who is away from the office.  Like patient to follow-up with Mariza to discuss her labs.  May take an over-the-counter vitamin D3 supplement at 5000 units daily.  Thyroid panel is off.  Probably nothing concerning at this time but she should follow-up with Mariza to discuss.  Thank you.Lico

## 2021-04-01 ENCOUNTER — OFFICE VISIT (OUTPATIENT)
Dept: MEDICAL GROUP | Facility: MEDICAL CENTER | Age: 21
End: 2021-04-01
Payer: COMMERCIAL

## 2021-04-01 ENCOUNTER — HOSPITAL ENCOUNTER (OUTPATIENT)
Dept: LAB | Facility: MEDICAL CENTER | Age: 21
End: 2021-04-01
Attending: NURSE PRACTITIONER
Payer: COMMERCIAL

## 2021-04-01 VITALS
HEART RATE: 77 BPM | WEIGHT: 148 LBS | TEMPERATURE: 98.1 F | OXYGEN SATURATION: 96 % | RESPIRATION RATE: 20 BRPM | DIASTOLIC BLOOD PRESSURE: 70 MMHG | BODY MASS INDEX: 27.23 KG/M2 | HEIGHT: 62 IN | SYSTOLIC BLOOD PRESSURE: 106 MMHG

## 2021-04-01 DIAGNOSIS — R79.89 LOW TSH LEVEL: ICD-10-CM

## 2021-04-01 DIAGNOSIS — F41.9 ANXIETY AND DEPRESSION: ICD-10-CM

## 2021-04-01 DIAGNOSIS — F32.A ANXIETY AND DEPRESSION: ICD-10-CM

## 2021-04-01 PROCEDURE — 36415 COLL VENOUS BLD VENIPUNCTURE: CPT

## 2021-04-01 PROCEDURE — 84443 ASSAY THYROID STIM HORMONE: CPT

## 2021-04-01 PROCEDURE — 99213 OFFICE O/P EST LOW 20 MIN: CPT | Performed by: NURSE PRACTITIONER

## 2021-04-01 PROCEDURE — 84439 ASSAY OF FREE THYROXINE: CPT

## 2021-04-01 PROCEDURE — 84481 FREE ASSAY (FT-3): CPT

## 2021-04-01 ASSESSMENT — PATIENT HEALTH QUESTIONNAIRE - PHQ9
1. LITTLE INTEREST OR PLEASURE IN DOING THINGS: NOT AT ALL
SUM OF ALL RESPONSES TO PHQ9 QUESTIONS 1 AND 2: 1
7. TROUBLE CONCENTRATING ON THINGS, SUCH AS READING THE NEWSPAPER OR WATCHING TELEVISION: SEVERAL DAYS
6. FEELING BAD ABOUT YOURSELF - OR THAT YOU ARE A FAILURE OR HAVE LET YOURSELF OR YOUR FAMILY DOWN: SEVERAL DAYS
2. FEELING DOWN, DEPRESSED, IRRITABLE, OR HOPELESS: SEVERAL DAYS
3. TROUBLE FALLING OR STAYING ASLEEP OR SLEEPING TOO MUCH: SEVERAL DAYS
5. POOR APPETITE OR OVEREATING: SEVERAL DAYS
9. THOUGHTS THAT YOU WOULD BE BETTER OFF DEAD, OR OF HURTING YOURSELF: NOT AT ALL
4. FEELING TIRED OR HAVING LITTLE ENERGY: SEVERAL DAYS
8. MOVING OR SPEAKING SO SLOWLY THAT OTHER PEOPLE COULD HAVE NOTICED. OR THE OPPOSITE, BEING SO FIGETY OR RESTLESS THAT YOU HAVE BEEN MOVING AROUND A LOT MORE THAN USUAL: NOT AT ALL
SUM OF ALL RESPONSES TO PHQ QUESTIONS 1-9: 6

## 2021-04-01 ASSESSMENT — FIBROSIS 4 INDEX: FIB4 SCORE: 0.39

## 2021-04-01 NOTE — ASSESSMENT & PLAN NOTE
Reports that she is feeling well, benefiting from fluoxetine 20 mg daily.  She feels that symptoms are adequately controlled at this time      Depression Screening    Little interest or pleasure in doing things?   Not at all  Feeling down, depressed , or hopeless?  Several days  Trouble falling or staying asleep, or sleeping too much?   Several days  Feeling tired or having little energy?   Several days  Poor appetite or overeating?   Several days  Feeling bad about yourself - or that you are a failure or have let yourself or your family down?  Several days  Trouble concentrating on things, such as reading the newspaper or watching television?  Several days  Moving or speaking so slowly that other people could have noticed.  Or the opposite - being so fidgety or restless that you have been moving around a lot more than usual?   Not at all  Thoughts that you would be better off dead, or of hurting yourself?   Not at all  Patient Health Questionnaire Score:  6      If depressive symptoms identified deferred to follow up visit unless specifically addressed in assesment and plan.    Interpretation of PHQ-9 Total Score   Score Severity   1-4 No Depression   5-9 Mild Depression   10-14 Moderate Depression   15-19 Moderately Severe Depression   20-27 Severe Depression

## 2021-04-01 NOTE — PROGRESS NOTES
Subjective:     Chief Complaint   Patient presents with   • Lab Follow-up     review     Jacquie Friedman is a 20 y.o. female here today to follow up on:    Anxiety and depression  Reports that she is feeling well, benefiting from fluoxetine 20 mg daily.  She feels that symptoms are adequately controlled at this time      Depression Screening    Little interest or pleasure in doing things?   Not at all  Feeling down, depressed , or hopeless?  Several days  Trouble falling or staying asleep, or sleeping too much?   Several days  Feeling tired or having little energy?   Several days  Poor appetite or overeating?   Several days  Feeling bad about yourself - or that you are a failure or have let yourself or your family down?  Several days  Trouble concentrating on things, such as reading the newspaper or watching television?  Several days  Moving or speaking so slowly that other people could have noticed.  Or the opposite - being so fidgety or restless that you have been moving around a lot more than usual?   Not at all  Thoughts that you would be better off dead, or of hurting yourself?   Not at all  Patient Health Questionnaire Score:  6      If depressive symptoms identified deferred to follow up visit unless specifically addressed in assesment and plan.    Interpretation of PHQ-9 Total Score   Score Severity   1-4 No Depression   5-9 Mild Depression   10-14 Moderate Depression   15-19 Moderately Severe Depression   20-27 Severe Depression        Low TSH level  Patient presents today to discuss recent labs with slightly low TSH value.  T4 was normal, pathophysiology reviewed.  She does have palpitations at times, reports that this has been ongoing for many years.  No specific exacerbating factors, tends to get better if she is able to calm herself down.  No associated shortness of breath, dizziness, presyncopal sensation.  No unexpected weight loss, diarrhea, heat or cold intolerance     Component      Latest Ref  "Rng & Units 3/23/2021 3/23/2021           8:44 AM  8:44 AM   TSH      0.380 - 5.330 uIU/mL 0.320 (L)    Free T-4      0.93 - 1.70 ng/dL  1.10     Current medicines (including changes today)  Current Outpatient Medications   Medication Sig Dispense Refill   • FLUoxetine (PROZAC) 20 MG Cap Take 1 Cap by mouth every day. 30 Cap 5     No current facility-administered medications for this visit.     She  has a past medical history of Ankle fracture, right and Healthy pediatric patient.    ROS included above     Objective:     /70 (BP Location: Left arm, Patient Position: Sitting, BP Cuff Size: Adult)   Pulse 77   Temp 36.7 °C (98.1 °F) (Temporal)   Resp 20   Ht 1.575 m (5' 2\")   Wt 67.1 kg (148 lb)   SpO2 96%  Body mass index is 27.07 kg/m².     Physical Exam:    Constitutional: Alert, no distress, well-groomed.  Skin: No rashes in visible areas.  Eye: Round. Conjunctiva clear, lids normal. No icterus.   ENMT: Lips pink without lesions, good dentition, moist mucous membranes. Phonation normal.  Neck: No masses, no thyromegaly. Moves freely without pain.  CV: Pulse as reported by patient  Respiratory: Unlabored respiratory effort, no cough or audible wheeze  Psych: Alert and oriented x3, normal affect and mood.       Assessment and Plan:   The following treatment plan was discussed   1. Anxiety and depression   stable at this time, continue present dose of fluoxetine   2. Low TSH level   very slightly abnormal TSH, T4 was normal.  No overt symptoms of hyperthyroidism.  She does have occasional palpitations but the use may be more anxiety driven.  We will recheck labs as listed  TSH    T3 FREE    FREE THYROXINE       Followup: pending labs         Please note that this dictation was created using voice recognition software. I have worked with consultants from the vendor as well as technical experts from [employer] to optimize the interface. I have made every reasonable attempt to correct obvious errors, but " I expect that there are errors of grammar and possibly content that I did not discover before finalizing the note.

## 2021-04-01 NOTE — ASSESSMENT & PLAN NOTE
Patient presents today to discuss recent labs with slightly low TSH value.  T4 was normal, pathophysiology reviewed.  She does have palpitations at times, reports that this has been ongoing for many years.  No specific exacerbating factors, tends to get better if she is able to calm herself down.  No associated shortness of breath, dizziness, presyncopal sensation.  No unexpected weight loss, diarrhea, heat or cold intolerance

## 2021-04-02 LAB
T3FREE SERPL-MCNC: 3.27 PG/ML (ref 2–4.4)
T4 FREE SERPL-MCNC: 1.06 NG/DL (ref 0.93–1.7)
TSH SERPL DL<=0.005 MIU/L-ACNC: 0.47 UIU/ML (ref 0.38–5.33)

## 2021-05-09 ENCOUNTER — PATIENT MESSAGE (OUTPATIENT)
Dept: MEDICAL GROUP | Facility: MEDICAL CENTER | Age: 21
End: 2021-05-09

## 2021-05-10 RX ORDER — AZELASTINE HYDROCHLORIDE 0.5 MG/ML
1 SOLUTION/ DROPS OPHTHALMIC 2 TIMES DAILY
Qty: 6 ML | Refills: 3 | Status: SHIPPED | OUTPATIENT
Start: 2021-05-10 | End: 2021-08-09

## 2021-05-25 ENCOUNTER — PATIENT MESSAGE (OUTPATIENT)
Dept: MEDICAL GROUP | Facility: MEDICAL CENTER | Age: 21
End: 2021-05-25

## 2021-05-25 DIAGNOSIS — F32.A ANXIETY AND DEPRESSION: ICD-10-CM

## 2021-05-25 DIAGNOSIS — F41.9 ANXIETY AND DEPRESSION: ICD-10-CM

## 2021-07-28 ENCOUNTER — PATIENT MESSAGE (OUTPATIENT)
Dept: MEDICAL GROUP | Facility: MEDICAL CENTER | Age: 21
End: 2021-07-28

## 2021-07-28 DIAGNOSIS — N92.6 IRREGULAR MENSES: ICD-10-CM

## 2021-08-09 ENCOUNTER — OCCUPATIONAL MEDICINE (OUTPATIENT)
Dept: URGENT CARE | Facility: CLINIC | Age: 21
End: 2021-08-09
Payer: COMMERCIAL

## 2021-08-09 VITALS
HEART RATE: 86 BPM | SYSTOLIC BLOOD PRESSURE: 102 MMHG | RESPIRATION RATE: 16 BRPM | DIASTOLIC BLOOD PRESSURE: 60 MMHG | HEIGHT: 62 IN | OXYGEN SATURATION: 97 % | TEMPERATURE: 98 F | BODY MASS INDEX: 28.52 KG/M2 | WEIGHT: 155 LBS

## 2021-08-09 DIAGNOSIS — S39.012A LOW BACK STRAIN, INITIAL ENCOUNTER: ICD-10-CM

## 2021-08-09 PROCEDURE — 99214 OFFICE O/P EST MOD 30 MIN: CPT | Performed by: PHYSICIAN ASSISTANT

## 2021-08-09 RX ORDER — FLUOXETINE 10 MG/1
TABLET, FILM COATED ORAL
COMMUNITY
Start: 2021-05-25 | End: 2021-08-09

## 2021-08-09 RX ORDER — PROPRANOLOL HYDROCHLORIDE 20 MG/1
20 TABLET ORAL 3 TIMES DAILY
COMMUNITY
End: 2022-12-16 | Stop reason: SDUPTHER

## 2021-08-09 ASSESSMENT — ENCOUNTER SYMPTOMS: BACK PAIN: 1

## 2021-08-09 ASSESSMENT — FIBROSIS 4 INDEX: FIB4 SCORE: 0.41

## 2021-08-09 NOTE — PROGRESS NOTES
"Subjective:      Jacquie Friedman is a 21 y.o. female who presents with Back Injury (DOI: 07/31/2021, lower back pain and sciatic nerve, unable to bend back and forward much, irritated as she sits down)    DOI 7/31/2021: Patient states she was breaking down equipment and moving into that she had when she had open the shed and she hurt her back and moving everything else inside of the she had made it worse. She felt sharp pain/pull in her back initially. She states she has had history of back issues and has seen a chiropractor in the past on and off. She has seen a chiropractor a couple of times since the injury occurred and states that it is helping her symptoms significantly. She states she has occasional shooting pain down the right side but mainly lower back pain. No red flag signs such as saddle anesthesia or bowel or bladder dysfunction.  Patient requesting referral to chiropractor.     HPI  Patient presents today for work-related injury as described above.  Review of Systems   Musculoskeletal: Positive for back pain.     PMH: No pertinent past medical history to this problem  MEDS: Medications were reviewed in Epic  ALLERGIES: Allergies were reviewed in Epic  SOCHX: Works as a  at [employer] [place]  FH: No pertinent family history to this problem       Objective:     /60 (BP Location: Left arm, Patient Position: Sitting, BP Cuff Size: Adult)   Pulse 86   Temp 36.7 °C (98 °F) (Temporal)   Resp 16   Ht 1.575 m (5' 2\")   Wt 70.3 kg (155 lb)   SpO2 97%   BMI 28.35 kg/m²      Physical Exam  Vitals and nursing note reviewed.   Constitutional:       General: She is not in acute distress.     Appearance: She is well-developed.   HENT:      Head: Normocephalic and atraumatic.      Right Ear: Hearing normal.      Left Ear: Hearing normal.   Eyes:      Pupils: Pupils are equal, round, and reactive to light.   Cardiovascular:      Rate and Rhythm: Normal rate and regular rhythm.      Heart " sounds: Normal heart sounds.   Pulmonary:      Effort: Pulmonary effort is normal.      Breath sounds: Normal breath sounds.   Musculoskeletal:        Back:       Comments: Low back as described below.   Skin:     General: Skin is warm and dry.   Neurological:      Mental Status: She is alert.      Coordination: Coordination normal.   Psychiatric:         Mood and Affect: Mood normal.         Mild tenderness to palpation to the lower lumbar area. Shooting pain with movement of the right lower extremity. Normal gait. Slightly limited forward bending and twisting at the hips.  Neurovascularly intact distally. Full strength in lower extremities. No step off deformity or swelling noted.  No midline spinous process tenderness.       Assessment/Plan:   1. Low back strain, initial encounter    - REFERRAL TO CHIROPRACTIC       Patient should continue work at light duty. She can continue following up with a chiropractor and she has been as this has been relieving her symptoms. Also recommend continuing ice/heat to the area.  Follow-up in 1-1/2 to 2 weeks for reevaluation.  Greater than 30 minutes were spent reviewing patient's chart, examining and obtaining history from patient, and discussing plan of care.

## 2021-08-09 NOTE — LETTER
"EMPLOYEE’S CLAIM FOR COMPENSATION/ REPORT OF INITIAL TREATMENT  FORM C-4    EMPLOYEE’S CLAIM - PROVIDE ALL INFORMATION REQUESTED   First Name  Jacquie Last Name  Elder Birthdate                    2000                Sex  female Claim Number   Home Address  [place] Age  21 y.o. Height  1.575 m (5' 2\") Weight  70.3 kg (155 lb) [facility]     [facility] Zip  [address] Telephone  [phone] (home)    Mailing Address  [address] [facility] Zip  [address] Primary Language Spoken  English    Insurer  [place] [employer] Third Party   [employer] [employer] Employee's Occupation (Job Title) When Injury or Occupational Disease Occurred      Employer's Name  [employer]  Telephone  [phone]    Employer Address  [address]  [facility] *** [facility] *** Zip  [address]    Date of Injury  7/31/2021               Hour of Injury  12:30 PM Date Employer Notified  7/31/2021 Last Day of Work after Injury     or Occupational Disease  8/6/2021 Supervisor to Whom Injury     Reported  [address]   Address or Location of Accident (if applicable)  Work [1]   What were you doing at the time of accident? (if applicable)  moving equipment to shed    How did this injury or occupational disease occur? (Be specific an answer in detail. Use additional sheet if necessary)  I was breaking down equipment & moving it to the shed. When I had to open  the shed I hurt my back & moving everything inside made it worse   If you believe that you have an occupational disease, when did you first have knowledge of the disability and it relationship to your employment?  n/a Witnesses to the Accident  n/a      Nature of Injury or Occupational Disease  Strain  Part(s) of Body Injured or Affected  Lower Back Area (Lumbar Area & Lumbo-Sacral), N/A, N/A    I certify that the above is true and correct to the best of my " knowledge and that I have provided this information in order to obtain the benefits of [place] or [place] of [place]).  I hereby authorize any physician, chiropractor, surgeon, practitioner, or other person, any hospital, including [facility] or [facility], any medical service organization, any insurance company, or other institution or organization to release to each other, any medical or other information, including benefits paid or payable, pertinent to this injury or disease, except information relative to diagnosis, treatment and/or counseling for AIDS, psychological conditions, alcohol or controlled substances, for which I must give specific authorization.  A Photostat of this authorization shall be as valid as the original.     Date 8/9/2021   [facility]   Employee’s Signature   THIS REPORT MUST BE COMPLETED AND MAILED WITHIN 3 WORKING DAYS OF TREATMENT   Place  [facility]  Name of Facility  [facility]   Date  8/9/2021 Diagnosis  (S39.012A) Low back strain, initial encounter Is there evidence the injured employee was under the              influence of alcohol and/or another controlled substance at the time of accident?   Hour  9:57 AM Description of Injury or Disease  The encounter diagnosis was Low back strain, initial encounter. No   Treatment  Patient should continue work at light duty. She can continue following up with a chiropractor and she has been as this has been relieving her symptoms. Also recommend continuing ice/heat to the area.  Follow-up in 1-1/2 to 2 weeks for reevaluation.  Have you advised the patient to remain off work five days or     more?     X-Ray Findings      If Yes   From Date  To Date      From information given by the employee, together with medical evidence, can you directly connect this injury or occupational disease as job incurred?  Yes If No  "Full Duty    No Modified Duty  Yes   Is additional medical care by a physician indicated?  Yes If Modified Duty, Specify any Limitations / Restrictions  Decreased lifting, bending as detailed on D-39.   Do you know of any previous injury or disease contributing to this condition or occupational disease?                            No   Date  8/9/2021 Print Doctor’s Name   Cristel Galvan P.A.-C. I certify the employer’s copy of  this form was mailed on:   Address  [address] Insurer’s Use Only     [facility] Zip  [address]    Provider’s Tax ID Number  [id number] Telephone  Dept: [phone]      e-CRISTEL Garvin P.A.-C.  Signature:     Degree          ORIGINAL-TREATING PHYSICIAN OR CHIROPRACTOR    PAGE 2-INSURER/TPA    PAGE 3-EMPLOYER    PAGE 4-EMPLOYEE        Form C-4 (rev.10/07)           BRIEF DESCRIPTION OF RIGHTS AND BENEFITS  (Pursuant to NRS 616C.050)    Notice of Injury or Occupational Disease (Incident Report Form C-1): If an injury or occupational disease (OD) arises out of and in the course of employment, you must provide written notice to your employer as soon as practicable, but no later than 7 days after the accident or OD. Your employer shall maintain a sufficient supply of the required forms.    Claim for Compensation (Form C-4): If medical treatment is sought, the form C-4 is available at the place of initial treatment. A completed \"Claim for Compensation\" (Form C-4) must be filed within 90 days after an accident or OD. The treating physician or chiropractor must, within 3 working days after treatment, complete and mail to the employer, the employer's insurer and third-party , the Claim for Compensation.    Medical Treatment: If you require medical treatment for your on-the-job injury or OD, you may be required to select a physician or chiropractor from a list provided by your workers’ compensation insurer, if it has contracted with an Organization " for Managed Care (MCO) or Preferred Provider Organization (PPO) or providers of health care. If your employer has not entered into a contract with an MCO or PPO, you may select a physician or chiropractor from the Panel of Physicians and Chiropractors. Any medical costs related to your industrial injury or OD will be paid by your insurer.    Temporary Total Disability (TTD): If your doctor has certified that you are unable to work for a period of at least 5 consecutive days, or 5 cumulative days in a 20-day period, or places restrictions on you that your employer does not accommodate, you may be entitled to TTD compensation.    Temporary Partial Disability (TPD): If the wage you receive upon reemployment is less than the compensation for TTD to which you are entitled, the insurer may be required to pay you TPD compensation to make up the difference. TPD can only be paid for a maximum of 24 months.    Permanent Partial Disability (PPD): When your medical condition is stable and there is an indication of a PPD as a result of your injury or OD, within 30 days, your insurer must arrange for an evaluation by a rating physician or chiropractor to determine the degree of your PPD. The amount of your PPD award depends on the date of injury, the results of the PPD evaluation, your age and wage.    Permanent Total Disability (PTD): If you are medically certified by a treating physician or chiropractor as permanently and totally disabled and have been granted a PTD status by your insurer, you are entitled to receive monthly benefits not to exceed 66 2/3% of your average monthly wage. The amount of your PTD payments is subject to reduction if you previously received a lump-sum PPD award.    Vocational Rehabilitation Services: You may be eligible for vocational rehabilitation services if you are unable to return to the job due to a permanent physical impairment or permanent restrictions as a result of your injury or occupational  disease.    Transportation and Per Faviola Reimbursement: You may be eligible for travel expenses and per faviola associated with medical treatment.    Reopening: You may be able to reopen your claim if your condition worsens after claim closure.     Appeal Process: If you disagree with a written determination issued by the insurer or the insurer does not respond to your request, you may appeal to the Department of Administration, , by following the instructions contained in your determination letter. You must appeal the determination within 70 days from the date of the determination letter at [address], [address], [place], [place] [address], or [address], [address], [place], [place] [address]. If you disagree with the  decision, you may appeal to the Department of Administration, . You must file your appeal within 30 days from the date of the  decision letter at [address], [address], [place], [place] [address], or [address], [place], [place] [address]. If you disagree with a decision of an , you may file a petition for judicial review with the District Court. You must do so within 30 days of the Appeal Officer’s decision. You may be represented by an  at your own expense or you may contact the [facility] for possible representation.    [place]  for Injured Workers (NAIW): If you disagree with a  decision, you may request that NAIW represent you without charge at an  Hearing. For information regarding denial of benefits, you may contact the [facility] at: [address], [address], [place] [address], [phone], or [address], [place] [address], [phone]    To File a Complaint with the Division: If you wish to file a complaint with the  of the Division of Industrial Relations (DIR),  please contact the  Workers’ Compensation Section, [address], [address], [place], [place] [address], telephone [phone]) [phone], or [address], [address], [place], [place] [address], telephone [phone].    For assistance with Workers’ Compensation Issues: You may contact the [facility] Office for Consumer Health Assistance, [address], [address], [place], [place] [address], Toll Free 1-239.456.8220, Web site: http://[facility].nv.gov/Programs/KELLEY E-mail: kelley@[facility].nv.gov              __________________________________________________________________                                    _________________            Employee Name / Signature                                                                                                                            Date                                                                                                                                                                                                                              D-2 (rev. 10/20)

## 2021-08-09 NOTE — LETTER
[facility]  [address] - [place] [address]  Phone:  [phone] - Fax:  [phone]   [facility] Progress Report and Disability Certification  Date of Service: 8/9/2021   No Show:  No  Date / Time of Next Visit: 8/19/2021   Claim Information   Patient Name: Jacquie Friedman  Claim Number:     Employer: [employer]  Date of Injury: 7/31/2021     Insurer / TPA: [employer] [employer]  ID / SSN:     Occupation:   Diagnosis: The encounter diagnosis was Low back strain, initial encounter.    Medical Information   Related to Industrial Injury? Yes    Subjective Complaints:  DOI 7/31/2021: Patient states she was breaking down equipment and moving into that she had when she had open the shed and she hurt her back and moving everything else inside of the she had made it worse. She felt sharp pain/pull in her back initially. She states she has had history of back issues and has seen a chiropractor in the past on and off. She has seen a chiropractor a couple of times since the injury occurred and states that it is helping her symptoms significantly. She states she has occasional shooting pain down the right side but mainly lower back pain. No red flag signs such as saddle anesthesia or bowel or bladder dysfunction.  Patient requesting referral to chiropractor.   Objective Findings: Mild tenderness to palpation to the lower lumbar area. Shooting pain with movement of the right lower extremity. Normal gait. Slightly limited forward bending and twisting at the hips.  Neurovascularly intact distally. Full strength in lower extremities. No step off deformity or swelling noted.  No midline spinous process tenderness.   Pre-Existing Condition(s):     Assessment:   Initial Visit    Status: Additional Care Required  Permanent Disability:No    Plan:      Diagnostics:      Comments:       Disability Information   Status: Released to  Restricted Duty    From:  2021  Through: 2021 Restrictions are: Temporary   Physical Restrictions   Sitting:    Standing:    Stooping:    Bendin hrs/day   Squatting:    Walking:    Climbing:    Pushing:  < or = to 2 hrs/day   Pulling:  < or = to 2 hrs/day Other:    Reaching Above Shoulder (L):   Reaching Above Shoulder (R):       Reaching Below Shoulder (L):    Reaching Below Shoulder (R):      Not to exceed Weight Limits   Carrying(hrs):   Weight Limit(lb): < or = to 25 pounds Lifting(hrs):   Weight  Limit(lb): < or = to 25 pounds   Comments: Patient should continue work at light duty. She can continue following up with a chiropractor and she has been as this has been relieving her symptoms. Also recommend continuing ice/heat to the area.  Follow-up in 1-1/2 to 2 weeks for reevaluation.    Repetitive Actions   Hands: i.e. Fine Manipulations from Grasping:     Feet: i.e. Operating Foot Controls:     Driving / Operate Machinery:     Provider Name:   Stefano Galvan P.A.-C. Physician Signature:  Physician Name:     Clinic Name / Location: [address]  [place] [address] Clinic Phone Number: Dept: [phone]   Appointment Time: 9:45 Am Visit Start Time: 9:57 AM   Check-In Time:  9:45 Am Visit Discharge Time:  10:44 AM   Original-Treating Physician or Chiropractor    Page 2-Insurer/TPA    Page 3-Employer    Page 4-Employee

## 2021-08-19 ENCOUNTER — OCCUPATIONAL MEDICINE (OUTPATIENT)
Dept: URGENT CARE | Facility: CLINIC | Age: 21
End: 2021-08-19
Payer: COMMERCIAL

## 2021-08-19 VITALS
BODY MASS INDEX: 28.52 KG/M2 | RESPIRATION RATE: 16 BRPM | HEIGHT: 62 IN | SYSTOLIC BLOOD PRESSURE: 108 MMHG | WEIGHT: 155 LBS | DIASTOLIC BLOOD PRESSURE: 70 MMHG | OXYGEN SATURATION: 96 % | TEMPERATURE: 98 F | HEART RATE: 73 BPM

## 2021-08-19 DIAGNOSIS — S39.012D STRAIN OF LUMBAR REGION, SUBSEQUENT ENCOUNTER: ICD-10-CM

## 2021-08-19 DIAGNOSIS — M54.31 SCIATICA OF RIGHT SIDE: ICD-10-CM

## 2021-08-19 PROCEDURE — 99213 OFFICE O/P EST LOW 20 MIN: CPT | Performed by: PHYSICIAN ASSISTANT

## 2021-08-19 ASSESSMENT — ENCOUNTER SYMPTOMS
BOWEL INCONTINENCE: 0
FALLS: 0
PERIANAL NUMBNESS: 0
TINGLING: 0
BACK PAIN: 1

## 2021-08-19 ASSESSMENT — FIBROSIS 4 INDEX: FIB4 SCORE: 0.41

## 2021-08-19 NOTE — PROGRESS NOTES
Subjective     Jacquie Friedman is a 21 y.o. female who presents with Follow-Up (W/C)      DOI 7/31/2021: Visit #2 today-patient reports that she has not yet been back to chiropractory as she is waiting for appointment.  She also reports that she has not yet been back to work-she does report that she recently went on vacation and that her back was improving however she picked up her luggage and started to have worsening low back pain.  She does report pain to her right buttock with radiation down her posterior leg consistent with sciatica.  She has been conducting stretches and icing the region of which she has been doing every day.  She is not taking any medication for such at this time as she does not like to take medication.  Copied from original visit- Patient states she was breaking down equipment and moving into that she had when she had open the shed and she hurt her back and moving everything else inside of the she had made it worse. She felt sharp pain/pull in her back initially. She states she has had history of back issues and has seen a chiropractor in the past on and off. She has seen a chiropractor a couple of times since the injury occurred and states that it is helping her symptoms significantly. She states she has occasional shooting pain down the right side but mainly lower back pain.     Back Pain  This is a new problem. Episode onset: 7/31. The problem occurs daily. The problem has been waxing and waning since onset. The pain is present in the lumbar spine. The quality of the pain is described as aching and stabbing. Pertinent negatives include no bladder incontinence, bowel incontinence, pelvic pain, perianal numbness or tingling. She has tried chiropractic manipulation for the symptoms.       Review of Systems   Gastrointestinal: Negative for bowel incontinence.   Genitourinary: Negative for bladder incontinence and pelvic pain.   Musculoskeletal: Positive for back pain. Negative for falls  "and joint pain.   Neurological: Negative for tingling.   All other systems reviewed and are negative.             Objective     /70   Pulse 73   Temp 36.7 °C (98 °F) (Temporal)   Resp 16   Ht 1.575 m (5' 2\")   Wt 70.3 kg (155 lb)   LMP 07/27/2021 (Exact Date)   SpO2 96%   Breastfeeding No   BMI 28.35 kg/m²      Physical Exam  Vitals reviewed.   Constitutional:       General: She is not in acute distress.     Appearance: She is well-developed.   HENT:      Head: Normocephalic and atraumatic.   Eyes:      Conjunctiva/sclera: Conjunctivae normal.      Pupils: Pupils are equal, round, and reactive to light.   Neck:      Trachea: No tracheal deviation.   Cardiovascular:      Rate and Rhythm: Normal rate.   Pulmonary:      Effort: Pulmonary effort is normal. No respiratory distress.   Musculoskeletal:      Cervical back: Normal range of motion and neck supple.   Skin:     General: Skin is warm.   Neurological:      Mental Status: She is alert and oriented to person, place, and time.   Psychiatric:         Behavior: Behavior normal.         Thought Content: Thought content normal.         Judgment: Judgment normal.         Spine: Without any midline spinal tenderness.  Without any paravertebral tenderness or fullness-right sacroiliac notch with noted tenderness with reproducible symptoms down the right leg.  Dorsi plantarflexion intact.  Normal sensation.  Negative straight leg raise.                   Assessment & Plan        1. Strain of lumbar region, subsequent encounter  2. Sciatica of right side    Strongly encourage the patient to utilize over-the-counter ibuprofen, ice and heat rotation and light stretching.  Patient does have pending chiropractory referral will have the patient follow-up with us on August 30 as this will give her time to follow-up with chiropractor as needed and she did feel notable improvement after previous visit.  Please  See D 39 for further information.  Light duty.  RTC sooner " for worsening symptoms.     Please note that this dictation was created using voice recognition software. I have made every reasonable attempt to correct obvious errors, but I expect that there are errors of grammar and possibly content that I did not discover before finalizing the note.

## 2021-08-19 NOTE — LETTER
[facility]  [address] - [place] [address]  Phone:  [phone] - Fax:  [phone]   [facility] Progress Report and Disability Certification  Date of Service: 8/19/2021   No Show:  No  Date / Time of Next Visit: 8/30/2021 @ 10:00 AM   Claim Information   Patient Name: Jacquie Friedman  Claim Number:     Employer: [employer]  Date of Injury: 7/31/2021     Insurer / TPA: [employer]  ID / SSN:     Occupation:   Diagnosis: Diagnoses of Strain of lumbar region, subsequent encounter and Sciatica of right side were pertinent to this visit.    Medical Information   Related to Industrial Injury? Yes    Subjective Complaints:  DOI 7/31/2021: Visit #2 today-patient reports that she has not yet been back to chiropractory as she is waiting for appointment.  She also reports that she has not yet been back to work-she does report that she recently went on vacation and that her back was improving however she picked up her luggage and started to have worsening low back pain.  She does report pain to her right buttock with radiation down her posterior leg consistent with sciatica.  She has been conducting stretches and icing the region of which she has been doing every day.  She is not taking any medication for such at this time as she does not like to take medication.  Copied from original visit- Patient states she was breaking down equipment and moving into that she had when she had open the shed and she hurt her back and moving everything else inside of the she had made it worse. She felt sharp pain/pull in her back initially. She states she has had history of back issues and has seen a chiropractor in the past on and off. She has seen a chiropractor a couple of times since the injury occurred and states that it is helping her symptoms significantly. She states she has occasional shooting pain down the right side but  mainly lower back pain.   Objective Findings: Spine: Without any midline spinal tenderness.  Without any paravertebral tenderness or fullness-right sacroiliac notch with noted tenderness with reproducible symptoms down the right leg.  Dorsi plantarflexion intact.  Normal sensation.  Negative straight leg raise.   Pre-Existing Condition(s):     Assessment:     Comments:Improved/however flared up again.     Status: Additional Care Required  Permanent Disability:No    Plan:      Diagnostics:      Comments:       Disability Information   Status: Released to Restricted Duty    From:  2021  Through: 2021 Restrictions are:     Physical Restrictions   Sitting:    Standing:    Stooping:    Bending:      Squatting:    Walking:    Climbin hrs/day Pushin hrs/day   Pullin hrs/day Other:    Reaching Above Shoulder (L):   Reaching Above Shoulder (R):       Reaching Below Shoulder (L):    Reaching Below Shoulder (R):      Not to exceed Weight Limits   Carrying(hrs):   Weight Limit(lb): < or = to 10 pounds Lifting(hrs):   Weight  Limit(lb): < or = to 10 pounds   Comments: Continue with piriformis stretching as discussed during the visit, ice and heat rotation and discussed over-the-counter NSAIDs.  Patient has referral to chiropractor he already made encourage patient to follow-up with such.  Patient will return to clinic on  for recheck or sooner for worsening symptoms.    Repetitive Actions   Hands: i.e. Fine Manipulations from Grasping:     Feet: i.e. Operating Foot Controls:     Driving / Operate Machinery:     Provider Name:   Alex Mendiola P.A.-C. Physician Signature:  Physician Name:     Clinic Name / Location: [facility] [address]  [place] [address] Clinic Phone Number: Dept: [phone]   Appointment Time: 11:00 Am Visit Start Time: 11:12 AM   Check-In Time:  10:34 Am Visit Discharge Time:  11:41 AM   Original-Treating Physician or  Chiropractor    Page 2-Insurer/TPA    Page 3-Employer    Page 4-Employee

## 2022-03-01 ENCOUNTER — NON-PROVIDER VISIT (OUTPATIENT)
Dept: URGENT CARE | Facility: CLINIC | Age: 22
End: 2022-03-01

## 2022-03-01 DIAGNOSIS — Z11.1 ENCOUNTER FOR PPD TEST: ICD-10-CM

## 2022-03-01 PROCEDURE — 99999 PR NO CHARGE: CPT | Performed by: FAMILY MEDICINE

## 2022-03-01 PROCEDURE — 86580 TB INTRADERMAL TEST: CPT | Performed by: FAMILY MEDICINE

## 2022-03-01 NOTE — PROGRESS NOTES
1. TB evaluation form completed by patient.     2. Pt notified to return to clinic for reading within 24-48 hours.     3. PPD placement documentation completed on TB questionnaire    4. TB Eval form filed at    Detail Level: Simple Post-Care Instructions: I reviewed with the patient in detail post-care instructions. Patient is to wear sunprotection, and avoid picking at any of the treated lesions. Pt may apply Vaseline to crusted or scabbing areas. Consent: The patient's consent was obtained including but not limited to risks of crusting, scabbing, blistering, scarring, darker or lighter pigmentary change, recurrence, incomplete removal and infection. Duration Of Freeze Thaw-Cycle (Seconds): 5 Render Note In Bullet Format When Appropriate: No Number Of Freeze-Thaw Cycles: 3 freeze-thaw cycles Medical Necessity Information: It is in your best interest to select a reason for this procedure from the list below. All of these items fulfill various CMS LCD requirements except the new and changing color options. Medical Necessity Clause: This procedure was medically necessary because the lesions that were treated were: Detail Level: Detailed Duration Of Freeze Thaw-Cycle (Seconds): 15-20

## 2022-03-04 ENCOUNTER — NON-PROVIDER VISIT (OUTPATIENT)
Dept: URGENT CARE | Facility: CLINIC | Age: 22
End: 2022-03-04

## 2022-03-04 LAB — TB WHEAL 3D P 5 TU DIAM: NORMAL MM

## 2022-03-04 PROCEDURE — 99999 PR NO CHARGE: CPT | Performed by: FAMILY MEDICINE

## 2022-03-04 NOTE — PROGRESS NOTES
1. Resulted in Epic under Enter/Edit Result    2. TB evaluation questionnaire scanned into chart and original given to patient.     3. Induration was 0.00mm

## 2022-05-11 ENCOUNTER — OFFICE VISIT (OUTPATIENT)
Dept: MEDICAL GROUP | Facility: MEDICAL CENTER | Age: 22
End: 2022-05-11
Payer: COMMERCIAL

## 2022-05-11 VITALS
SYSTOLIC BLOOD PRESSURE: 116 MMHG | HEART RATE: 84 BPM | HEIGHT: 62 IN | OXYGEN SATURATION: 95 % | DIASTOLIC BLOOD PRESSURE: 82 MMHG | BODY MASS INDEX: 30.18 KG/M2 | WEIGHT: 164.02 LBS | TEMPERATURE: 97.7 F

## 2022-05-11 DIAGNOSIS — F41.9 ANXIETY AND DEPRESSION: ICD-10-CM

## 2022-05-11 DIAGNOSIS — J45.20 MILD INTERMITTENT REACTIVE AIRWAY DISEASE WITHOUT COMPLICATION: ICD-10-CM

## 2022-05-11 DIAGNOSIS — J30.2 SEASONAL ALLERGIES: ICD-10-CM

## 2022-05-11 DIAGNOSIS — F32.A ANXIETY AND DEPRESSION: ICD-10-CM

## 2022-05-11 DIAGNOSIS — R79.89 LOW TSH LEVEL: ICD-10-CM

## 2022-05-11 DIAGNOSIS — Z00.00 PREVENTATIVE HEALTH CARE: ICD-10-CM

## 2022-05-11 DIAGNOSIS — N92.1 MENORRHAGIA WITH IRREGULAR CYCLE: ICD-10-CM

## 2022-05-11 PROBLEM — S99.912A INJURY OF LEFT ANKLE: Status: RESOLVED | Noted: 2019-10-22 | Resolved: 2022-05-11

## 2022-05-11 PROCEDURE — 99214 OFFICE O/P EST MOD 30 MIN: CPT | Performed by: INTERNAL MEDICINE

## 2022-05-11 RX ORDER — ALBUTEROL SULFATE 90 UG/1
2 AEROSOL, METERED RESPIRATORY (INHALATION) EVERY 4 HOURS PRN
Qty: 1 EACH | Refills: 1 | Status: SHIPPED | OUTPATIENT
Start: 2022-05-11

## 2022-05-11 RX ORDER — AZELASTINE HYDROCHLORIDE 0.5 MG/ML
1 SOLUTION/ DROPS OPHTHALMIC 2 TIMES DAILY
Qty: 6 ML | Refills: 1 | Status: SHIPPED | OUTPATIENT
Start: 2022-05-11 | End: 2023-09-26 | Stop reason: SDUPTHER

## 2022-05-11 RX ORDER — FLUOXETINE 10 MG/1
10 CAPSULE ORAL DAILY
Qty: 90 CAPSULE | Refills: 3 | Status: SHIPPED | OUTPATIENT
Start: 2022-05-11 | End: 2022-09-20

## 2022-05-11 ASSESSMENT — FIBROSIS 4 INDEX: FIB4 SCORE: 0.41

## 2022-05-11 ASSESSMENT — ANXIETY QUESTIONNAIRES
1. FEELING NERVOUS, ANXIOUS, OR ON EDGE: MORE THAN HALF THE DAYS
7. FEELING AFRAID AS IF SOMETHING AWFUL MIGHT HAPPEN: SEVERAL DAYS
4. TROUBLE RELAXING: MORE THAN HALF THE DAYS
2. NOT BEING ABLE TO STOP OR CONTROL WORRYING: MORE THAN HALF THE DAYS
6. BECOMING EASILY ANNOYED OR IRRITABLE: MORE THAN HALF THE DAYS
3. WORRYING TOO MUCH ABOUT DIFFERENT THINGS: MORE THAN HALF THE DAYS
GAD7 TOTAL SCORE: 13
5. BEING SO RESTLESS THAT IT IS HARD TO SIT STILL: MORE THAN HALF THE DAYS

## 2022-05-11 ASSESSMENT — PATIENT HEALTH QUESTIONNAIRE - PHQ9
5. POOR APPETITE OR OVEREATING: 1 - SEVERAL DAYS
CLINICAL INTERPRETATION OF PHQ2 SCORE: 1
SUM OF ALL RESPONSES TO PHQ QUESTIONS 1-9: 8

## 2022-05-11 ASSESSMENT — ENCOUNTER SYMPTOMS
COUGH: 0
SORE THROAT: 0
CHILLS: 0
VOMITING: 0
NAUSEA: 0
FEVER: 0

## 2022-05-11 NOTE — PROGRESS NOTES
Subjective:     Chief Complaint   Patient presents with   • Menstrual Problem      Diagnoses of Low TSH level, Preventative health care, Seasonal allergies, Mild intermittent reactive airway disease without complication, Anxiety and depression, and Menorrhagia with irregular cycle were pertinent to this visit.    HISTORY OF THE PRESENT ILLNESS: Patient is a 21 y.o. female. This pleasant patient is here today to establish care. Her prior PCP was Teri Montano.    Problem   Anxiety and Depression    PHQ-9 Screening 5/11/2022 3/22/2021 3/18/2020 11/21/2019 10/22/2019   Little interest or pleasure in doing things - - - 0 2   Feeling down, depressed, or hopeless - - - 1 2   Trouble falling or staying asleep, or sleeping too much - - - 0 3   Feeling tired or having little energy - - - 1 3   Poor appetite or overeating - - - 0 0   Feeling bad about yourself - or that you are a failure or have let yourself or your family down - - - 1 1   Trouble concentrating on things, such as reading the newspaper or watching television - - - 0 1   Moving or speaking so slowly that other people could have noticed. Or the opposite - being so fidgety or restless that you have been moving around a lot more than usual - - - 2 2   Thoughts that you would be better off dead, or of hurting yourself in some way - - - 0 1   PHQ-2 Total Score - - - 1 4   PHQ-9 Total Score - - - 5 15   Little interest or pleasure in doing things - - - - -   Little interest or pleasure in doing things 0 - not at all 0 - not at all 1 - several days - -   Feeling down, depressed, or hopeless - - - - -   Feeling down, depressed, or hopeless 1 - several days 0 - not at all 1 - several days - -   Trouble falling or staying asleep, or sleeping too much 1 - several days - 2 - more than half the days - -   Feeling tired or having little energy 1 - several days - 1 - several days - -   Poor appetite or overeating 1 - several days - 2 - more than half the days - -   Feeling bad  about yourself - or that you are a failure or have let yourself or your family down 1 - several days - 1 - several days - -   Trouble concentrating on things, such as reading the newspaper or watching television 1 - several days - 2 - more than half the days - -   Moving or speaking so slowly that other people could have noticed. Or the opposite - being so fidgety or restless that you have been moving around a lot more than usual 2 - more than half the days - 3 - nearly every day - -   Thoughts that you would be better off dead, or of hurting yourself in some way 0 - not at all - 0 - not at all - -   PHQ-2 Total Score - - - - -   PHQ-2 Total Score 1 0 2 - -   PHQ-9 Total Score 8 - 13 - -       Interpretation of PHQ-9 Total Score   Score Severity   1-4 No Depression   5-9 Mild Depression   10-14 Moderate Depression   15-19 Moderately Severe Depression   20-27 Severe Depression    PRISCILA-7 Questionnaire    Feeling nervous, anxious, or on edge: More than half the days  Not being able to sop or control worrying: More than half the days  Worrying too much about different things: More than half the days  Trouble relaxing: More than half the days  Being so restless that it's hard to sit still: More than half the days  Becoming easily annoyed or irritable: More than half the days  Feeling afraid as if something awful might happen: Several days  Total: 13    Interpretation of PRISCILA 7 Total Score   Score Severity :  0-4 No Anxiety   5-9 Mild Anxiety  10-14 Moderate Anxiety  15-21 Severe Anxiety  Patient is taking fluoxetine 10 mg tablet daily.  Symptoms are fairly well controlled.  No suicidal thoughts or ideations.  Patient had her finals this week so she was more stressed than generally.  She does have propranolol to use as needed and she is not interested in increasing the dose of her fluoxetine at this time.     Menorrhagia With Irregular Cycle    Patient reports having menstrual cycles, that generally last 8 days: 5 days of  "heavy flow, then 3 days of mild menstrual flow, afterwards she would be spotting for 2 more weeks.  She reports no galactorrhea, vision changes.  She had tried multiple OCPs in the past, however she was unable to tolerate them.  She is interested in alternative management-advised to discuss with her gynecology potential IUD insertion.       Past Medical History:   Diagnosis Date   • Ankle fracture, right    • Healthy pediatric patient    • Injury of left ankle 10/22/2019       Current Outpatient Medications Ordered in Epic   Medication Sig Dispense Refill   • azelastine (OPTIVAR) 0.05 % ophthalmic solution Administer 1 Drop into both eyes in the morning and 1 Drop in the evening. 6 mL 1   • albuterol 108 (90 Base) MCG/ACT Aero Soln inhalation aerosol Inhale 2 Puffs every four hours as needed for Shortness of Breath. 1 Each 1   • FLUoxetine (PROZAC) 10 MG Cap Take 1 Capsule by mouth every day. 90 Capsule 3   • propranolol (INDERAL) 20 MG Tab Take 20 mg by mouth 3 times a day.       No current [facility]-ordered facility-administered medications on file.     Health Maintenance: see HPI    Review of Systems   Constitutional: Negative for chills and fever.   HENT: Negative for sore throat.    Respiratory: Negative for cough.    Cardiovascular: Negative for chest pain.   Gastrointestinal: Negative for nausea and vomiting.        Objective:     Exam: /82 (BP Location: Right arm, Patient Position: Sitting, BP Cuff Size: Adult)   Pulse 84   Temp 36.5 °C (97.7 °F) (Temporal)   Ht 1.575 m (5' 2\")   Wt 74.4 kg (164 lb 0.4 oz)   SpO2 95%  Body mass index is 30 kg/m².    Physical Exam  Vitals reviewed: overweight.   Constitutional:       Appearance: Normal appearance.   HENT:      Head: Normocephalic and atraumatic.      Nose: Nose normal. No congestion.      Mouth/Throat:      Mouth: Mucous membranes are moist.      Pharynx: Oropharynx is clear. No oropharyngeal exudate or posterior oropharyngeal erythema.   Eyes:      " General: No scleral icterus.  Cardiovascular:      Rate and Rhythm: Normal rate and regular rhythm.      Pulses: Normal pulses.      Heart sounds: Normal heart sounds. No murmur heard.  Musculoskeletal:      Right lower leg: No edema.      Left lower leg: No edema.   Skin:     General: Skin is warm and dry.   Neurological:      General: No focal deficit present.      Mental Status: She is alert. Mental status is at baseline.      Gait: Gait normal.   Psychiatric:         Mood and Affect: Mood normal.         Behavior: Behavior normal.         Thought Content: Thought content normal.         Judgment: Judgment normal.       Labs: Reviewed TSH, T3, free T4 from 4/01/2021    Assessment & Plan:   21 y.o. female with the following -    Problem List Items Addressed This Visit     Anxiety and depression     Chronic, controlled on sertraline 10 mg tablet daily.  Continue as prescribed.  Patient has no suicidal thoughts or ideations.           Relevant Medications    FLUoxetine (PROZAC) 10 MG Cap    Low TSH level    Relevant Orders    TSH    FREE THYROXINE    Menorrhagia with irregular cycle     This is a chronic ongoing issue, patient will discuss in details with her gynecology.  Will obtain CBC to assess for hemoglobin level, thyroid hormone levels.           Reactive airway disease    Relevant Medications    albuterol 108 (90 Base) MCG/ACT Aero Soln inhalation aerosol    Seasonal allergies    Relevant Medications    azelastine (OPTIVAR) 0.05 % ophthalmic solution    albuterol 108 (90 Base) MCG/ACT Aero Soln inhalation aerosol      Other Visit Diagnoses     Preventative health care        Relevant Orders    CBC WITH DIFFERENTIAL    Comp Metabolic Panel        Return in about 1 year (around 5/11/2023), or if symptoms worsen or fail to improve.    Please note that this dictation was created using voice recognition software. I have made every reasonable attempt to correct obvious errors, but I expect that there are errors of  grammar and possibly content that I did not discover before finalizing the note.

## 2022-05-17 ENCOUNTER — HOSPITAL ENCOUNTER (OUTPATIENT)
Dept: LAB | Facility: MEDICAL CENTER | Age: 22
End: 2022-05-17
Attending: INTERNAL MEDICINE
Payer: COMMERCIAL

## 2022-05-17 DIAGNOSIS — R79.89 LOW TSH LEVEL: ICD-10-CM

## 2022-05-17 DIAGNOSIS — Z00.00 PREVENTATIVE HEALTH CARE: ICD-10-CM

## 2022-05-17 LAB
ALBUMIN SERPL BCP-MCNC: 4.4 G/DL (ref 3.2–4.9)
ALBUMIN/GLOB SERPL: 1.7 G/DL
ALP SERPL-CCNC: 95 U/L (ref 30–99)
ALT SERPL-CCNC: 16 U/L (ref 2–50)
ANION GAP SERPL CALC-SCNC: 10 MMOL/L (ref 7–16)
AST SERPL-CCNC: 23 U/L (ref 12–45)
BASOPHILS # BLD AUTO: 0.8 % (ref 0–1.8)
BASOPHILS # BLD: 0.05 K/UL (ref 0–0.12)
BILIRUB SERPL-MCNC: 0.7 MG/DL (ref 0.1–1.5)
BUN SERPL-MCNC: 8 MG/DL (ref 8–22)
CALCIUM SERPL-MCNC: 8.9 MG/DL (ref 8.5–10.5)
CHLORIDE SERPL-SCNC: 105 MMOL/L (ref 96–112)
CO2 SERPL-SCNC: 20 MMOL/L (ref 20–33)
CREAT SERPL-MCNC: 0.59 MG/DL (ref 0.5–1.4)
EOSINOPHIL # BLD AUTO: 0.58 K/UL (ref 0–0.51)
EOSINOPHIL NFR BLD: 9.4 % (ref 0–6.9)
ERYTHROCYTE [DISTWIDTH] IN BLOOD BY AUTOMATED COUNT: 45.1 FL (ref 35.9–50)
FASTING STATUS PATIENT QL REPORTED: NORMAL
GFR SERPLBLD CREATININE-BSD FMLA CKD-EPI: 131 ML/MIN/1.73 M 2
GLOBULIN SER CALC-MCNC: 2.6 G/DL (ref 1.9–3.5)
GLUCOSE SERPL-MCNC: 77 MG/DL (ref 65–99)
HCT VFR BLD AUTO: 42.1 % (ref 37–47)
HGB BLD-MCNC: 14.1 G/DL (ref 12–16)
IMM GRANULOCYTES # BLD AUTO: 0.01 K/UL (ref 0–0.11)
IMM GRANULOCYTES NFR BLD AUTO: 0.2 % (ref 0–0.9)
LYMPHOCYTES # BLD AUTO: 1.82 K/UL (ref 1–4.8)
LYMPHOCYTES NFR BLD: 29.4 % (ref 22–41)
MCH RBC QN AUTO: 31.6 PG (ref 27–33)
MCHC RBC AUTO-ENTMCNC: 33.5 G/DL (ref 33.6–35)
MCV RBC AUTO: 94.4 FL (ref 81.4–97.8)
MONOCYTES # BLD AUTO: 0.47 K/UL (ref 0–0.85)
MONOCYTES NFR BLD AUTO: 7.6 % (ref 0–13.4)
NEUTROPHILS # BLD AUTO: 3.27 K/UL (ref 2–7.15)
NEUTROPHILS NFR BLD: 52.6 % (ref 44–72)
NRBC # BLD AUTO: 0 K/UL
NRBC BLD-RTO: 0 /100 WBC
PLATELET # BLD AUTO: 277 K/UL (ref 164–446)
PMV BLD AUTO: 11 FL (ref 9–12.9)
POTASSIUM SERPL-SCNC: 4.4 MMOL/L (ref 3.6–5.5)
PROT SERPL-MCNC: 7 G/DL (ref 6–8.2)
RBC # BLD AUTO: 4.46 M/UL (ref 4.2–5.4)
SODIUM SERPL-SCNC: 135 MMOL/L (ref 135–145)
T4 FREE SERPL-MCNC: 1.06 NG/DL (ref 0.93–1.7)
TSH SERPL DL<=0.005 MIU/L-ACNC: 0.52 UIU/ML (ref 0.38–5.33)
WBC # BLD AUTO: 6.2 K/UL (ref 4.8–10.8)

## 2022-05-17 PROCEDURE — 84443 ASSAY THYROID STIM HORMONE: CPT

## 2022-05-17 PROCEDURE — 36415 COLL VENOUS BLD VENIPUNCTURE: CPT

## 2022-05-17 PROCEDURE — 80053 COMPREHEN METABOLIC PANEL: CPT

## 2022-05-17 PROCEDURE — 85025 COMPLETE CBC W/AUTO DIFF WBC: CPT

## 2022-05-17 PROCEDURE — 84439 ASSAY OF FREE THYROXINE: CPT

## 2022-05-23 ENCOUNTER — EH NON-PROVIDER (OUTPATIENT)
Dept: OCCUPATIONAL MEDICINE | Facility: CLINIC | Age: 22
End: 2022-05-23

## 2022-05-23 ENCOUNTER — HOSPITAL ENCOUNTER (OUTPATIENT)
Facility: MEDICAL CENTER | Age: 22
End: 2022-05-23
Attending: NURSE PRACTITIONER
Payer: COMMERCIAL

## 2022-05-23 ENCOUNTER — EMPLOYEE HEALTH (OUTPATIENT)
Dept: OCCUPATIONAL MEDICINE | Facility: CLINIC | Age: 22
End: 2022-05-23

## 2022-05-23 DIAGNOSIS — Z02.1 PRE-EMPLOYMENT DRUG SCREENING: ICD-10-CM

## 2022-05-23 DIAGNOSIS — Z02.1 PRE-EMPLOYMENT DRUG SCREENING: Primary | ICD-10-CM

## 2022-05-23 DIAGNOSIS — Z02.1 PRE-EMPLOYMENT HEALTH SCREENING EXAMINATION: ICD-10-CM

## 2022-05-23 PROCEDURE — 94375 RESPIRATORY FLOW VOLUME LOOP: CPT | Performed by: NURSE PRACTITIONER

## 2022-05-23 PROCEDURE — 86480 TB TEST CELL IMMUN MEASURE: CPT | Performed by: NURSE PRACTITIONER

## 2022-05-23 PROCEDURE — 8915 PR COMPREHENSIVE PHYSICAL: Performed by: NURSE PRACTITIONER

## 2022-05-24 LAB
GAMMA INTERFERON BACKGROUND BLD IA-ACNC: 0.03 IU/ML
M TB IFN-G BLD-IMP: NEGATIVE
M TB IFN-G CD4+ BCKGRND COR BLD-ACNC: 0 IU/ML
MITOGEN IGNF BCKGRD COR BLD-ACNC: >10 IU/ML
QFT TB2 - NIL TBQ2: 0.01 IU/ML

## 2022-08-17 ENCOUNTER — APPOINTMENT (OUTPATIENT)
Dept: OCCUPATIONAL MEDICINE | Facility: CLINIC | Age: 22
End: 2022-08-17

## 2022-09-01 ENCOUNTER — HOSPITAL ENCOUNTER (EMERGENCY)
Facility: MEDICAL CENTER | Age: 22
End: 2022-09-01
Attending: EMERGENCY MEDICINE
Payer: COMMERCIAL

## 2022-09-01 VITALS
WEIGHT: 166.67 LBS | RESPIRATION RATE: 16 BRPM | HEART RATE: 82 BPM | HEIGHT: 62 IN | OXYGEN SATURATION: 98 % | SYSTOLIC BLOOD PRESSURE: 120 MMHG | BODY MASS INDEX: 30.67 KG/M2 | DIASTOLIC BLOOD PRESSURE: 83 MMHG | TEMPERATURE: 97.5 F
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
## 2022-09-01 DIAGNOSIS — S01.85XA DOG BITE OF FACE, INITIAL ENCOUNTER: ICD-10-CM

## 2022-09-01 PROCEDURE — 700102 HCHG RX REV CODE 250 W/ 637 OVERRIDE(OP): Performed by: EMERGENCY MEDICINE

## 2022-09-01 PROCEDURE — 99283 EMERGENCY DEPT VISIT LOW MDM: CPT

## 2022-09-01 PROCEDURE — A9270 NON-COVERED ITEM OR SERVICE: HCPCS | Performed by: EMERGENCY MEDICINE

## 2022-09-01 RX ORDER — AMOXICILLIN AND CLAVULANATE POTASSIUM 875; 125 MG/1; MG/1
1 TABLET, FILM COATED ORAL 2 TIMES DAILY
Qty: 14 TABLET | Refills: 0 | Status: SHIPPED | OUTPATIENT
Start: 2022-09-01 | End: 2022-09-08

## 2022-09-01 RX ORDER — AMOXICILLIN AND CLAVULANATE POTASSIUM 875; 125 MG/1; MG/1
1 TABLET, FILM COATED ORAL ONCE
Status: COMPLETED | OUTPATIENT
Start: 2022-09-01 | End: 2022-09-01

## 2022-09-01 RX ADMIN — AMOXICILLIN AND CLAVULANATE POTASSIUM 1 TABLET: 875; 125 TABLET, FILM COATED ORAL at 09:12

## 2022-09-01 ASSESSMENT — FIBROSIS 4 INDEX: FIB4 SCORE: 0.46

## 2022-09-01 NOTE — ED PROVIDER NOTES
"ED Provider Note    CHIEF COMPLAINT  Chief Complaint   Patient presents with    Dog Bite       HPI  Jacquie Friedman is a 22 y.o. female who presents to the emergency department through triage for dog bite to her face.  Patient was holding her own dog, 3-year-oldChihuahua mix when she got agitated and bit her upper lip.  Dog is fully vaccinated.  Patient vaccinated, tetanus up-to-date as well.  Bleeding controlled prior to arrival.    REVIEW OF SYSTEMS  See HPI for further details. All other systems are negative.     PAST MEDICAL HISTORY   has a past medical history of Ankle fracture, right, Healthy pediatric patient, and Injury of left ankle (10/22/2019).    SOCIAL HISTORY  Social History     Tobacco Use    Smoking status: Never    Smokeless tobacco: Never   Vaping Use    Vaping Use: Never used   Substance and Sexual Activity    Alcohol use: Yes     Alcohol/week: 0.6 oz     Types: 1 Standard drinks or equivalent per week    Drug use: No    Sexual activity: Never       SURGICAL HISTORY  patient denies any surgical history    CURRENT MEDICATIONS  Home Medications    **Home medications have not yet been reviewed for this encounter**         ALLERGIES  Allergies   Allergen Reactions    Chocolate Hives     Throat gets itchy       PHYSICAL EXAM  VITAL SIGNS: /81   Pulse 93   Temp 36.4 °C (97.5 °F) (Temporal)   Resp 17   Ht 1.575 m (5' 2\")   Wt 75.6 kg (166 lb 10.7 oz)   SpO2 98%   BMI 30.48 kg/m²   Pulse ox interpretation: I interpret this pulse ox as normal.  Constitutional: Alert in no apparent distress.  HENT: Normocephalic, atraumatic. Bilateral external ears normal, Nose normal.  There 2 cm superficial linear laceration from just inferior to the right nasolabial fold to the superior portion of the vermilion border of the right upper lip.  No active bleeding or hematoma.  The inferior portion gapes for 0.5 cm.  No through and through injury.  No other trauma.  Eyes: Pupils are equal and reactive, " Conjunctiva normal.   Neck: Normal range of motion  Cardiovascular: Normal peripheral perfusion.  Thorax & Lungs: Nonlabored respirations.  Skin: Warm, Dry  Musculoskeletal: Good range of motion in all major joints.   Neurologic: Alert and orient x4.  Moves 4 extremity spontaneously.  Psychiatric: Affect normal, Judgment normal, Mood normal.       DIAGNOSTIC STUDIES / PROCEDURES  LACERATION REPAIR PROCEDURE NOTE  The patient's identification was confirmed and consent was obtained.  This procedure was performed by Dr. Moore  Site: Face, right upper lip  Sterile procedures observe  Anesthetic used (type and amt): None  Suture type/size: Steri-Strips   Length: 2 cm  # of Sutures: 3 Steri-Strips  Complexity: simple  Antibx ointment applied after irrigation prior to Steri-Strips, edges wiped clean and cleansed with alcohol swab before benzoin application and Steri-Strip placement.  Tetanus UTD   Site anesthetized, irrigated with NS, explored without evidence of foreign body, wound well approximated, site covered with dry, sterile dressing. Patient tolerated procedure well without complications. Instructions for care discussed verbally and patient provided with additional written instructions for homecare and f/u.    COURSE & MEDICAL DECISION MAKING  Facial laceration following dog bite was irrigated, covered with antibiotic ointment, approximated with Steri-Strips and secured with benzoin with good hemostasis and approximation.  Sutures, Dermabond not indicated in this setting following dog bite for risk of increased infection.  Patient and dog vaccines are up-to-date.    Patient is stable for discharge at this time, anticipatory guidance and wound care instructions provided, close follow-up is encouraged for wound check in 48 hours and patient is aware she can follow-up with plastic surgery for cosmetic correction if indicated once wound is healed, and strict ED return instructions have been detailed. Patient  and her  mother are agreeable to the disposition and plan.      FINAL IMPRESSION  (S01.85XA,  W54.0XXA) Dog bite of face, initial encounter      Electronically signed by: Amirah Moore D.O., 9/1/2022 8:54 AM      This dictation was created using voice recognition software. The accuracy of the dictation is limited to the abilities of the software. I expect there may be some errors of grammar and possibly content. The nursing notes were reviewed and certain aspects of this information were incorporated into this note.

## 2022-09-01 NOTE — ED NOTES
Released with all follow-up, medicated per order. Steri strips were applied by ERP. Pt to return in 2 days for wound check or sooner if signs of infection appear. Verbalized understanding cleaning and sings of infection.

## 2022-09-01 NOTE — DISCHARGE INSTRUCTIONS
Follow-up with primary care or ED in 48 hours for reevaluation and wound check.  You can follow-up with plastic surgery once wound is completely healed for cosmetic correction if needed.    Augmentin twice daily for 7 days.  Tylenol or ibuprofen as needed for discomfort.    Keep wound clean and dry.  Steri-Strips should peel and fall off over time.  If wound is still open when Steri-Strips fall off, add antibiotic ointment twice daily.  Keep wound clean and dry, cleanse gently with warm water, soap, pat dry.  Avoid soaking wound in water.    Return to the emergency department for lip swelling, redness, bleeding or other drainage, fever or other new concerns.

## 2022-09-19 PROBLEM — F41.9 ANXIETY AND DEPRESSION: Chronic | Status: ACTIVE | Noted: 2019-10-22

## 2022-09-19 PROBLEM — F32.A ANXIETY AND DEPRESSION: Chronic | Status: ACTIVE | Noted: 2019-10-22

## 2022-09-20 ENCOUNTER — OFFICE VISIT (OUTPATIENT)
Dept: MEDICAL GROUP | Facility: MEDICAL CENTER | Age: 22
End: 2022-09-20
Payer: COMMERCIAL

## 2022-09-20 VITALS
TEMPERATURE: 97.6 F | SYSTOLIC BLOOD PRESSURE: 122 MMHG | DIASTOLIC BLOOD PRESSURE: 62 MMHG | HEART RATE: 78 BPM | OXYGEN SATURATION: 98 %

## 2022-09-20 DIAGNOSIS — F32.A ANXIETY AND DEPRESSION: ICD-10-CM

## 2022-09-20 DIAGNOSIS — F41.9 ANXIETY AND DEPRESSION: ICD-10-CM

## 2022-09-20 DIAGNOSIS — Z23 NEED FOR VACCINATION: ICD-10-CM

## 2022-09-20 PROCEDURE — 90686 IIV4 VACC NO PRSV 0.5 ML IM: CPT | Performed by: INTERNAL MEDICINE

## 2022-09-20 PROCEDURE — 99214 OFFICE O/P EST MOD 30 MIN: CPT | Mod: 25 | Performed by: INTERNAL MEDICINE

## 2022-09-20 PROCEDURE — 90471 IMMUNIZATION ADMIN: CPT | Performed by: INTERNAL MEDICINE

## 2022-09-20 RX ORDER — FLUOXETINE HYDROCHLORIDE 20 MG/1
20 CAPSULE ORAL DAILY
Qty: 90 CAPSULE | Refills: 2 | Status: SHIPPED | OUTPATIENT
Start: 2022-09-20 | End: 2023-07-07

## 2022-09-20 ASSESSMENT — ANXIETY QUESTIONNAIRES
7. FEELING AFRAID AS IF SOMETHING AWFUL MIGHT HAPPEN: NEARLY EVERY DAY
3. WORRYING TOO MUCH ABOUT DIFFERENT THINGS: NEARLY EVERY DAY
4. TROUBLE RELAXING: NEARLY EVERY DAY
1. FEELING NERVOUS, ANXIOUS, OR ON EDGE: NEARLY EVERY DAY
2. NOT BEING ABLE TO STOP OR CONTROL WORRYING: NEARLY EVERY DAY
6. BECOMING EASILY ANNOYED OR IRRITABLE: NEARLY EVERY DAY
GAD7 TOTAL SCORE: 21
5. BEING SO RESTLESS THAT IT IS HARD TO SIT STILL: NEARLY EVERY DAY

## 2022-09-20 ASSESSMENT — ENCOUNTER SYMPTOMS
NAUSEA: 0
VOMITING: 0
NERVOUS/ANXIOUS: 1
DEPRESSION: 1
SORE THROAT: 0
COUGH: 0
CHILLS: 0
FEVER: 0

## 2022-09-20 ASSESSMENT — PATIENT HEALTH QUESTIONNAIRE - PHQ9
5. POOR APPETITE OR OVEREATING: 2 - MORE THAN HALF THE DAYS
SUM OF ALL RESPONSES TO PHQ QUESTIONS 1-9: 17
CLINICAL INTERPRETATION OF PHQ2 SCORE: 4

## 2022-09-20 ASSESSMENT — LIFESTYLE VARIABLES: SUBSTANCE_ABUSE: 0

## 2022-09-20 NOTE — PROGRESS NOTES
Subjective:     Chief Complaint   Patient presents with    Medication Refill     Diagnoses of Anxiety and depression and Need for vaccination were pertinent to this visit.    HPI: Jacquie is a pleasant 22 y.o. female who presents today to discuss anxiety and depression symptoms    Problem   Need for Vaccination    Patient is due for influenza vaccination, no prior allergic reactions to influenza vaccine.        Anxiety and Depression    This is a chronic problem, currently uncontrolled.  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  She is currently taking fluoxetine 10 mg tablet daily.  She used to be on 20 mg in the past and she tolerated it well.  She is using propranolol as needed.    She denies suicidal thoughts or ideations.    PHQ-9 Screening 9/20/2022 5/11/2022 3/22/2021 3/18/2020 11/21/2019   Little interest or pleasure in doing things - - - - 0   Feeling down, depressed, or hopeless - - - - 1   Trouble falling or staying asleep, or sleeping too much - - - - 0   Feeling tired or having little energy - - - - 1   Poor appetite or overeating - - - - 0   Feeling bad about yourself - or that you are a failure or have let yourself or your family down - - - - 1   Trouble concentrating on things, such as reading the newspaper or watching television - - - - 0   Moving or speaking so slowly that other people could have noticed. Or the opposite - being so fidgety or restless that you have been moving around a lot more than usual - - - - 2   Thoughts that you would be better off dead, or of hurting yourself in some way - - - - 0   PHQ-2 Total Score - - - - 1   PHQ-9 Total Score - - - - 5   Little interest or pleasure in doing things - - - - -   Little interest or pleasure in doing things 2 - more than half the days 0 - not at all 0 - not at all 1 - several days -   Feeling down, depressed, or hopeless - - - - -   Feeling down, depressed, or hopeless 2 - more than half the  days 1 - several days 0 - not at all 1 - several days -   Trouble falling or staying asleep, or sleeping too much 3 - nearly every day 1 - several days - 2 - more than half the days -   Feeling tired or having little energy 2 - more than half the days 1 - several days - 1 - several days -   Poor appetite or overeating 2 - more than half the days 1 - several days - 2 - more than half the days -   Feeling bad about yourself - or that you are a failure or have let yourself or your family down 2 - more than half the days 1 - several days - 1 - several days -   Trouble concentrating on things, such as reading the newspaper or watching television 1 - several days 1 - several days - 2 - more than half the days -   Moving or speaking so slowly that other people could have noticed. Or the opposite - being so fidgety or restless that you have been moving around a lot more than usual 3 - nearly every day 2 - more than half the days - 3 - nearly every day -   Thoughts that you would be better off dead, or of hurting yourself in some way 0 - not at all 0 - not at all - 0 - not at all -   PHQ-2 Total Score - - - - -   PHQ-2 Total Score 4 1 0 2 -   PHQ-9 Total Score 17 8 - 13 -       Interpretation of PHQ-9 Total Score   Score Severity   1-4 No Depression   5-9 Mild Depression   10-14 Moderate Depression   15-19 Moderately Severe Depression   20-27 Severe Depression    PRISCILA-7 Questionnaire    Feeling nervous, anxious, or on edge: Nearly every day  Not being able to sop or control worrying: Nearly every day  Worrying too much about different things: Nearly every day  Trouble relaxing: Nearly every day  Being so restless that it's hard to sit still: Nearly every day  Becoming easily annoyed or irritable: Nearly every day  Feeling afraid as if something awful might happen: Nearly every day  Total: 21    Interpretation of PRISCILA 7 Total Score   Score Severity :  0-4 No Anxiety   5-9 Mild Anxiety  10-14 Moderate Anxiety  15-21 Severe  Anxiety        Propranolol PRN Therapy   Increaer to 20              Current Outpatient Medications Ordered in Epic   Medication Sig Dispense Refill    FLUoxetine (PROZAC) 20 MG Cap Take 1 Capsule by mouth every day. 90 Capsule 2    azelastine (OPTIVAR) 0.05 % ophthalmic solution Administer 1 Drop into both eyes in the morning and 1 Drop in the evening. 6 mL 1    albuterol 108 (90 Base) MCG/ACT Aero Soln inhalation aerosol Inhale 2 Puffs every four hours as needed for Shortness of Breath. 1 Each 1    propranolol (INDERAL) 20 MG Tab Take 20 mg by mouth 3 times a day.       No current [facility]-ordered facility-administered medications on file.       Review of Systems   Constitutional:  Negative for chills and fever.   HENT:  Negative for sore throat.    Respiratory:  Negative for cough.    Cardiovascular:  Negative for chest pain.   Gastrointestinal:  Negative for nausea and vomiting.   Psychiatric/Behavioral:  Positive for depression. Negative for substance abuse and suicidal ideas. The patient is nervous/anxious.      Objective:     Exam:  /62 (BP Location: Left arm, Patient Position: Sitting, BP Cuff Size: Adult)   Pulse 78   Temp 36.4 °C (97.6 °F) (Temporal)   SpO2 98%  There is no height or weight on file to calculate BMI.    Physical Exam  Vitals reviewed: overweight.   Constitutional:       Appearance: Normal appearance.   HENT:      Head: Normocephalic and atraumatic.   Cardiovascular:      Rate and Rhythm: Normal rate and regular rhythm.      Pulses: Normal pulses.      Heart sounds: Normal heart sounds. No murmur heard.  Musculoskeletal:      Right lower leg: No edema.      Left lower leg: No edema.   Neurological:      General: No focal deficit present.      Mental Status: She is alert.      Gait: Gait normal.   Psychiatric:         Mood and Affect: Mood normal.         Behavior: Behavior normal.     Assessment & Plan:   Jacquie  is a pleasant 22 y.o. female with the following -     Problem List Items  Addressed This Visit       Anxiety and depression (Chronic)     This is a chronic problem, currently uncontrolled.     - Increase fluoxetine to 20 mg daily   - Continue propranolol as needed   - Continue therapy   - Return to care if symptoms worsen or fail to improve.         Relevant Medications    FLUoxetine (PROZAC) 20 MG Cap    Other Relevant Orders    Patient has been identified as having a positive depression screening. Appropriate orders and counseling have been given. (Completed)    Need for vaccination     Administer influenza vaccine as directed.         Relevant Orders    INFLUENZA VACCINE QUAD INJ (PF) (Completed)     Return if symptoms worsen or fail to improve.    Please note that this dictation was created using voice recognition software. I have made every reasonable attempt to correct obvious errors, but I expect that there are errors of grammar and possibly content that I did not discover before finalizing the note.

## 2022-09-20 NOTE — ASSESSMENT & PLAN NOTE
This is a chronic problem, currently uncontrolled.     - Increase fluoxetine to 20 mg daily   - Continue propranolol as needed   - Continue therapy   - Return to care if symptoms worsen or fail to improve.

## 2022-11-30 ENCOUNTER — OFFICE VISIT (OUTPATIENT)
Dept: MEDICAL GROUP | Facility: MEDICAL CENTER | Age: 22
End: 2022-11-30
Payer: COMMERCIAL

## 2022-11-30 ENCOUNTER — HOSPITAL ENCOUNTER (OUTPATIENT)
Facility: MEDICAL CENTER | Age: 22
End: 2022-11-30
Attending: INTERNAL MEDICINE
Payer: COMMERCIAL

## 2022-11-30 VITALS
HEIGHT: 62 IN | BODY MASS INDEX: 30.02 KG/M2 | HEART RATE: 82 BPM | TEMPERATURE: 97.8 F | WEIGHT: 163.14 LBS | OXYGEN SATURATION: 99 % | DIASTOLIC BLOOD PRESSURE: 76 MMHG | SYSTOLIC BLOOD PRESSURE: 102 MMHG

## 2022-11-30 DIAGNOSIS — J02.9 ACUTE PHARYNGITIS, UNSPECIFIED ETIOLOGY: ICD-10-CM

## 2022-11-30 DIAGNOSIS — J02.9 PHARYNGITIS, UNSPECIFIED ETIOLOGY: ICD-10-CM

## 2022-11-30 LAB
INT CON NEG: NEGATIVE
INT CON POS: POSITIVE
S PYO AG THROAT QL: NEGATIVE

## 2022-11-30 PROCEDURE — U0003 INFECTIOUS AGENT DETECTION BY NUCLEIC ACID (DNA OR RNA); SEVERE ACUTE RESPIRATORY SYNDROME CORONAVIRUS 2 (SARS-COV-2) (CORONAVIRUS DISEASE [COVID-19]), AMPLIFIED PROBE TECHNIQUE, MAKING USE OF HIGH THROUGHPUT TECHNOLOGIES AS DESCRIBED BY CMS-2020-01-R: HCPCS

## 2022-11-30 PROCEDURE — 87077 CULTURE AEROBIC IDENTIFY: CPT | Mod: 91

## 2022-11-30 PROCEDURE — 99213 OFFICE O/P EST LOW 20 MIN: CPT | Performed by: INTERNAL MEDICINE

## 2022-11-30 PROCEDURE — U0005 INFEC AGEN DETEC AMPLI PROBE: HCPCS

## 2022-11-30 PROCEDURE — 87880 STREP A ASSAY W/OPTIC: CPT | Performed by: INTERNAL MEDICINE

## 2022-11-30 PROCEDURE — 87070 CULTURE OTHR SPECIMN AEROBIC: CPT

## 2022-11-30 ASSESSMENT — ENCOUNTER SYMPTOMS
CHILLS: 0
FEVER: 0
SORE THROAT: 1
SHORTNESS OF BREATH: 0

## 2022-11-30 ASSESSMENT — FIBROSIS 4 INDEX: FIB4 SCORE: 0.46

## 2022-11-30 NOTE — PROGRESS NOTES
"Subjective:     Chief Complaint   Patient presents with    Pharyngitis     Diagnoses of Pharyngitis, unspecified etiology and Acute pharyngitis, unspecified etiology were pertinent to this visit.      HPI: Jacquie is a pleasant 22 y.o. female who presents today for evaluation of    Problem   Acute Pharyngitis    This is an acute problem, symptoms started on Monday, 11/28/2022.  Patient has been experiencing irritation of the back of her throat.  Home remedies: [place] gargles, multivitamins, hot tea.  She does have history of recurrent strep pharyngitis.  She was told in the past, that she had Streptococcus strain, which was not detected on rapid strep test.           Past Medical History:   Diagnosis Date    Ankle fracture, right     Healthy pediatric patient     Injury of left ankle 10/22/2019     Current Outpatient Medications Ordered in Epic   Medication Sig Dispense Refill    FLUoxetine (PROZAC) 20 MG Cap Take 1 Capsule by mouth every day. 90 Capsule 2    azelastine (OPTIVAR) 0.05 % ophthalmic solution Administer 1 Drop into both eyes in the morning and 1 Drop in the evening. 6 mL 1    albuterol 108 (90 Base) MCG/ACT Aero Soln inhalation aerosol Inhale 2 Puffs every four hours as needed for Shortness of Breath. 1 Each 1    propranolol (INDERAL) 20 MG Tab Take 20 mg by mouth 3 times a day.       No current [facility]-ordered facility-administered medications on file.     Health Maintenance: deferred    Review of Systems   Constitutional:  Negative for chills and fever.   HENT:  Positive for sore throat.    Respiratory:  Negative for shortness of breath.    Cardiovascular:  Negative for chest pain.     Objective:     Exam:  /76 (BP Location: Left arm, Patient Position: Sitting, BP Cuff Size: Adult)   Pulse 82   Temp 36.6 °C (97.8 °F) (Temporal)   Ht 1.575 m (5' 2\")   Wt 74 kg (163 lb 2.3 oz)   SpO2 99%   BMI 29.84 kg/m²  Body mass index is 29.84 kg/m².    Physical Exam  Constitutional:       General: She is " not in acute distress.     Appearance: Normal appearance. She is not toxic-appearing.   HENT:      Nose: Nose normal.      Mouth/Throat:      Mouth: Mucous membranes are moist.      Pharynx: Oropharynx is clear. Posterior oropharyngeal erythema (minimal) present.   Cardiovascular:      Rate and Rhythm: Normal rate and regular rhythm.      Pulses: Normal pulses.      Heart sounds: Normal heart sounds. No murmur heard.  Pulmonary:      Effort: Pulmonary effort is normal. No respiratory distress.      Breath sounds: Normal breath sounds.   Neurological:      Mental Status: She is alert and oriented to person, place, and time.     Labs: per orders     Assessment & Plan:   Jacquie  is a pleasant 22 y.o. female with the following -     Problem List Items Addressed This Visit       Acute pharyngitis     Acute pharyngitis, likely viral.  POCT rapid strep test is negative in clinic.  Low suspicion for bacterial infection at this time, no treatment with antibiotic is indicated.  Continue symptomatic treatment with generous hydration, salt gargles, lozenges.  Send out throat culture and COVID PCR.         Relevant Orders    CULTURE THROAT    SARS-CoV-2 PCR (24 hour In-House): Collect NP swab in VTM     Other Visit Diagnoses       Pharyngitis, unspecified etiology        Relevant Orders    CULTURE THROAT          Return if symptoms worsen or fail to improve.    Please note that this dictation was created using voice recognition software. I have made every reasonable attempt to correct obvious errors, but I expect that there are errors of grammar and possibly content that I did not discover before finalizing the note.

## 2022-12-01 DIAGNOSIS — J02.9 PHARYNGITIS, UNSPECIFIED ETIOLOGY: ICD-10-CM

## 2022-12-01 DIAGNOSIS — J02.9 ACUTE PHARYNGITIS, UNSPECIFIED ETIOLOGY: ICD-10-CM

## 2022-12-01 LAB
SARS-COV-2 RNA RESP QL NAA+PROBE: NOTDETECTED
SPECIMEN SOURCE: NORMAL

## 2022-12-01 NOTE — ASSESSMENT & PLAN NOTE
Acute pharyngitis, likely viral.  POCT rapid strep test is negative in clinic.  Low suspicion for bacterial infection at this time, no treatment with antibiotic is indicated.  Continue symptomatic treatment with generous hydration, salt gargles, lozenges.  Send out throat culture and COVID PCR.

## 2022-12-03 LAB
BACTERIA SPEC RESP CULT: ABNORMAL
SIGNIFICANT IND 70042: ABNORMAL
SITE SITE: ABNORMAL
SOURCE SOURCE: ABNORMAL

## 2022-12-05 ENCOUNTER — PATIENT MESSAGE (OUTPATIENT)
Dept: MEDICAL GROUP | Facility: MEDICAL CENTER | Age: 22
End: 2022-12-05
Payer: COMMERCIAL

## 2022-12-06 RX ORDER — AMOXICILLIN AND CLAVULANATE POTASSIUM 875; 125 MG/1; MG/1
1 TABLET, FILM COATED ORAL 2 TIMES DAILY
Qty: 14 TABLET | Refills: 0 | Status: SHIPPED | OUTPATIENT
Start: 2022-12-06 | End: 2022-12-13

## 2022-12-16 ENCOUNTER — PATIENT MESSAGE (OUTPATIENT)
Dept: MEDICAL GROUP | Facility: MEDICAL CENTER | Age: 22
End: 2022-12-16
Payer: COMMERCIAL

## 2022-12-16 RX ORDER — PROPRANOLOL HYDROCHLORIDE 20 MG/1
20 TABLET ORAL 3 TIMES DAILY PRN
Qty: 90 TABLET | Refills: 3 | Status: SHIPPED | OUTPATIENT
Start: 2022-12-16

## 2022-12-16 NOTE — PATIENT COMMUNICATION
25 Received request via: Patient    Was the patient seen in the last year in this department? Yes    Does the patient have an active prescription (recently filled or refills available) for medication(s) requested? No    Does the patient have senior living Plus and need 100 day supply (blood pressure, diabetes and cholesterol meds only)? Patient does not have SCP

## 2023-05-11 ENCOUNTER — APPOINTMENT (OUTPATIENT)
Dept: MEDICAL GROUP | Facility: MEDICAL CENTER | Age: 23
End: 2023-05-11
Payer: COMMERCIAL

## 2023-05-23 ENCOUNTER — EH NON-PROVIDER (OUTPATIENT)
Dept: OCCUPATIONAL MEDICINE | Facility: CLINIC | Age: 23
End: 2023-05-23

## 2023-05-23 DIAGNOSIS — Z02.89 ENCOUNTER FOR OCCUPATIONAL HEALTH EXAMINATION INVOLVING RESPIRATOR: ICD-10-CM

## 2023-05-23 PROCEDURE — 94375 RESPIRATORY FLOW VOLUME LOOP: CPT | Performed by: PREVENTIVE MEDICINE

## 2023-09-13 ENCOUNTER — TELEMEDICINE (OUTPATIENT)
Dept: MEDICAL GROUP | Facility: MEDICAL CENTER | Age: 23
End: 2023-09-13
Payer: COMMERCIAL

## 2023-09-13 VITALS
OXYGEN SATURATION: 97 % | SYSTOLIC BLOOD PRESSURE: 114 MMHG | HEIGHT: 62 IN | BODY MASS INDEX: 29.84 KG/M2 | HEART RATE: 79 BPM | DIASTOLIC BLOOD PRESSURE: 82 MMHG

## 2023-09-13 DIAGNOSIS — G47.23 IRREGULAR SLEEP-WAKE RHYTHM, NONORGANIC ORIGIN: ICD-10-CM

## 2023-09-13 DIAGNOSIS — E55.9 VITAMIN D DEFICIENCY: ICD-10-CM

## 2023-09-13 DIAGNOSIS — F32.A ANXIETY AND DEPRESSION: ICD-10-CM

## 2023-09-13 DIAGNOSIS — F41.9 ANXIETY AND DEPRESSION: ICD-10-CM

## 2023-09-13 DIAGNOSIS — Z00.00 PREVENTATIVE HEALTH CARE: ICD-10-CM

## 2023-09-13 DIAGNOSIS — Z02.9 ADMINISTRATIVE ENCOUNTER: ICD-10-CM

## 2023-09-13 DIAGNOSIS — R79.89 LOW TSH LEVEL: ICD-10-CM

## 2023-09-13 PROCEDURE — 99214 OFFICE O/P EST MOD 30 MIN: CPT | Mod: 95 | Performed by: INTERNAL MEDICINE

## 2023-09-13 RX ORDER — FLUOXETINE HYDROCHLORIDE 20 MG/1
20 CAPSULE ORAL DAILY
Qty: 90 CAPSULE | Refills: 3 | Status: SHIPPED | OUTPATIENT
Start: 2023-09-13

## 2023-09-13 ASSESSMENT — PATIENT HEALTH QUESTIONNAIRE - PHQ9
SUM OF ALL RESPONSES TO PHQ9 QUESTIONS 1 AND 2: 0
SUM OF ALL RESPONSES TO PHQ QUESTIONS 1-9: 0
7. TROUBLE CONCENTRATING ON THINGS, SUCH AS READING THE NEWSPAPER OR WATCHING TELEVISION: NOT AT ALL
8. MOVING OR SPEAKING SO SLOWLY THAT OTHER PEOPLE COULD HAVE NOTICED. OR THE OPPOSITE, BEING SO FIGETY OR RESTLESS THAT YOU HAVE BEEN MOVING AROUND A LOT MORE THAN USUAL: NOT AT ALL
1. LITTLE INTEREST OR PLEASURE IN DOING THINGS: NOT AT ALL
4. FEELING TIRED OR HAVING LITTLE ENERGY: NOT AT ALL
6. FEELING BAD ABOUT YOURSELF - OR THAT YOU ARE A FAILURE OR HAVE LET YOURSELF OR YOUR FAMILY DOWN: NOT AL ALL
2. FEELING DOWN, DEPRESSED, IRRITABLE, OR HOPELESS: NOT AT ALL
3. TROUBLE FALLING OR STAYING ASLEEP OR SLEEPING TOO MUCH: NOT AT ALL
9. THOUGHTS THAT YOU WOULD BE BETTER OFF DEAD, OR OF HURTING YOURSELF: NOT AT ALL
5. POOR APPETITE OR OVEREATING: NOT AT ALL

## 2023-09-13 ASSESSMENT — ENCOUNTER SYMPTOMS
SHORTNESS OF BREATH: 0
SORE THROAT: 0
INSOMNIA: 1
FEVER: 0
CHILLS: 0

## 2023-09-13 NOTE — LETTER
September 13, 2023    Letter of medical necessity.     To Whom It May Concern:         This is confirmation that Jacquie Friedman has been a patient under my care since 05/11/2022. Due to her medical conditions she will benefit from the [employer] health tracking device.          If you have any questions please do not hesitate to call me at the phone number listed below.    Sincerely,          Tomasa Muhammad M.D.  [phone]

## 2023-09-13 NOTE — PROGRESS NOTES
Subjective:     CC:   Diagnoses of Administrative encounter, Anxiety and depression, Low TSH level, Vitamin D deficiency, Preventative health care, and Irregular sleep-wake rhythm, nonorganic origin were pertinent to this visit.    This visit was conducted via Zoom using secure and encrypted videoconferencing technology.   The patient was in their home in the [facility].    The patient's identity was confirmed and verbal consent was obtained for this virtual visit.     HPI: Jacquie is a pleasant 23 y.o. female who presents today to discuss the following problems:    Problem   Administrative Encounter    Patient would like to get fitness activity tracker to track her sleep/stress/activities to help her manage her anxiety and insomnia better.    Letter of medical necessity provided.     Anxiety and Depression    This is a chronic problem, controlled.  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  She is currently taking fluoxetine 20 mg tablet daily.  She is using propranolol as needed.    She has no suicidal thoughts or ideations.         Irregular Sleep-Wake Rhythm, Nonorganic Origin    Chronic problem, insomnia secondary to anxiety.         Past Medical History:   Diagnosis Date    Ankle fracture, right     Healthy pediatric patient     Injury of left ankle 10/22/2019     Current Outpatient Medications Ordered in Epic   Medication Sig Dispense Refill    FLUoxetine (PROZAC) 20 MG Cap Take 1 Capsule by mouth every day. 90 Capsule 3    propranolol (INDERAL) 20 MG Tab Take 1 Tablet by mouth 3 times a day as needed (anxiety/panic). 90 Tablet 3    azelastine (OPTIVAR) 0.05 % ophthalmic solution Administer 1 Drop into both eyes in the morning and 1 Drop in the evening. 6 mL 1    albuterol 108 (90 Base) MCG/ACT Aero Soln inhalation aerosol Inhale 2 Puffs every four hours as needed for Shortness of Breath. 1 Each 1     No current [facility]-ordered facility-administered medications on file.     Review of  "Systems   Constitutional:  Negative for chills and fever.   HENT:  Negative for sore throat.    Respiratory:  Negative for shortness of breath.    Cardiovascular:  Negative for chest pain.   Psychiatric/Behavioral:  The patient has insomnia.      Objective:     Exam:  /82   Pulse 79   Ht 1.575 m (5' 2\")   SpO2 97%   BMI 29.84 kg/m²  Body mass index is 29.84 kg/m².    Physical Exam  Constitutional:       Appearance: Normal appearance.   Pulmonary:      Effort: Pulmonary effort is normal.   Neurological:      Mental Status: She is alert and oriented to person, place, and time.   Psychiatric:         Mood and Affect: Mood normal.       Labs: pending     Assessment & Plan:   Jacquie  is a pleasant 23 y.o. female with the following -     Problem List Items Addressed This Visit       Anxiety and depression (Chronic)     Chronic problem, stable, controlled on fluoxetine 20 mg daily, she does use propranolol occasionally for anxiety symptoms.           Relevant Medications    FLUoxetine (PROZAC) 20 MG Cap    Administrative encounter    Irregular sleep-wake rhythm, nonorganic origin     Chronic, stable, managed with fluoxetine and propranolol as needed.  Patient will benefit from fitness/sleep tracker         Low TSH level    Relevant Orders    TSH WITH REFLEX TO FT4     Other Visit Diagnoses       Vitamin D deficiency        Relevant Orders    VITAMIN D,25 HYDROXY (DEFICIENCY)    Preventative health care        Relevant Orders    CBC WITH DIFFERENTIAL    Comp Metabolic Panel            Return if symptoms worsen or fail to improve.    Please note that this dictation was created using voice recognition software. I have made every reasonable attempt to correct obvious errors, but I expect that there are errors of grammar and possibly content that I did not discover before finalizing the note.        "

## 2023-09-13 NOTE — ASSESSMENT & PLAN NOTE
Chronic, stable, managed with fluoxetine and propranolol as needed.  Patient will benefit from fitness/sleep tracker

## 2023-09-26 DIAGNOSIS — J30.2 SEASONAL ALLERGIES: ICD-10-CM

## 2023-09-27 RX ORDER — AZELASTINE HYDROCHLORIDE 0.5 MG/ML
1 SOLUTION/ DROPS OPHTHALMIC 2 TIMES DAILY
Qty: 6 ML | Refills: 1 | Status: SHIPPED | OUTPATIENT
Start: 2023-09-27

## 2023-11-28 ENCOUNTER — OFFICE VISIT (OUTPATIENT)
Dept: MEDICAL GROUP | Facility: MEDICAL CENTER | Age: 23
End: 2023-11-28
Payer: COMMERCIAL

## 2023-11-28 VITALS
HEIGHT: 62 IN | BODY MASS INDEX: 32.13 KG/M2 | WEIGHT: 174.6 LBS | DIASTOLIC BLOOD PRESSURE: 84 MMHG | OXYGEN SATURATION: 98 % | HEART RATE: 71 BPM | TEMPERATURE: 97.9 F | SYSTOLIC BLOOD PRESSURE: 116 MMHG

## 2023-11-28 DIAGNOSIS — Z23 NEED FOR VACCINATION: ICD-10-CM

## 2023-11-28 DIAGNOSIS — H00.019 HORDEOLUM EXTERNUM, UNSPECIFIED LATERALITY: ICD-10-CM

## 2023-11-28 DIAGNOSIS — G47.26 SHIFT WORK SLEEP DISORDER: ICD-10-CM

## 2023-11-28 PROCEDURE — 90471 IMMUNIZATION ADMIN: CPT | Performed by: INTERNAL MEDICINE

## 2023-11-28 PROCEDURE — 99214 OFFICE O/P EST MOD 30 MIN: CPT | Mod: 25 | Performed by: INTERNAL MEDICINE

## 2023-11-28 PROCEDURE — 3079F DIAST BP 80-89 MM HG: CPT | Performed by: INTERNAL MEDICINE

## 2023-11-28 PROCEDURE — 90686 IIV4 VACC NO PRSV 0.5 ML IM: CPT | Performed by: INTERNAL MEDICINE

## 2023-11-28 PROCEDURE — 3074F SYST BP LT 130 MM HG: CPT | Performed by: INTERNAL MEDICINE

## 2023-11-28 RX ORDER — TRAZODONE HYDROCHLORIDE 50 MG/1
25-100 TABLET ORAL NIGHTLY
Qty: 90 TABLET | Refills: 3 | Status: SHIPPED | OUTPATIENT
Start: 2023-11-28

## 2023-11-28 RX ORDER — ERYTHROMYCIN 5 MG/G
1 OINTMENT OPHTHALMIC 2 TIMES DAILY
Qty: 3.5 G | Refills: 1 | Status: SHIPPED | OUTPATIENT
Start: 2023-11-28

## 2023-11-28 ASSESSMENT — ENCOUNTER SYMPTOMS
DOUBLE VISION: 0
EYE DISCHARGE: 0
BLURRED VISION: 0

## 2023-11-28 ASSESSMENT — FIBROSIS 4 INDEX: FIB4 SCORE: 0.48

## 2023-11-28 NOTE — PROGRESS NOTES
Subjective:     CC:   Chief Complaint   Patient presents with    Stye     Left eye      Diagnoses of Hordeolum externum, unspecified laterality, Need for vaccination, and Shift work sleep disorder were pertinent to this visit.    HPI: Jacquie is a pleasant 23 y.o. female who presents today for evaluation of the following problems:    Problem   Hordeolum Externum (Stye)    About a month ago patient has noticed a stye of the upper eyelid.  She has been using warm compresses and it has shrunk in size.  It is located on the left upper eyelid has been flaring up her night shifts.  She does have history of dry eyes and history of recurrent stye on her lower.  She is due for her eye exam with optometrist.  She has tried warm compresses at home, which have been somewhat helpful.      She will continue warm compresses, trial of erythromycin ophthalmic ointment.    Follow-up with optometry.    Recommendations on dry eyes: Eye mask, generous hydration, omega-3 supplements     Shift Work Sleep Disorder    Chronic problem, unstable, patient works nights at the [employer].  She has tried melatonin and it has not been helpful.    Sleep hygiene discussed.    Trial of trazodone  mg 30 minutes before sleep.       Past Medical History:   Diagnosis Date    Ankle fracture, right     Healthy pediatric patient     Injury of left ankle 10/22/2019     Current Outpatient Medications Ordered in Epic   Medication Sig Dispense Refill    erythromycin 5 MG/GM Ointment Apply 1 Application to left eye 2 times a day. 3.5 g 1    traZODone (DESYREL) 50 MG Tab Take 0.5-2 Tablets by mouth every evening. 90 Tablet 3    azelastine (OPTIVAR) 0.05 % ophthalmic solution Administer 1 Drop into both eyes 2 times a day. 6 mL 1    FLUoxetine (PROZAC) 20 MG Cap Take 1 Capsule by mouth every day. 90 Capsule 3    propranolol (INDERAL) 20 MG Tab Take 1 Tablet by mouth 3 times a day as needed (anxiety/panic). 90 Tablet 3    albuterol 108 (90 Base) MCG/ACT Aero Soln  "inhalation aerosol Inhale 2 Puffs every four hours as needed for Shortness of Breath. 1 Each 1     No current [facility]-ordered facility-administered medications on file.     Review of Systems   Eyes:  Negative for blurred vision, double vision and discharge.   All other systems reviewed and are negative.    Objective:     Exam:  /84 (BP Location: Left arm, Patient Position: Sitting, BP Cuff Size: Adult)   Pulse 71   Temp 36.6 °C (97.9 °F) (Temporal)   Ht 1.575 m (5' 2\")   Wt 79.2 kg (174 lb 9.6 oz)   SpO2 98%   BMI 31.93 kg/m²  Body mass index is 31.93 kg/m².    Physical Exam  Constitutional:       Appearance: Normal appearance.   Eyes:      General:         Left eye: Hordeolum present.No discharge.      Extraocular Movements:      Right eye: Normal extraocular motion.      Left eye: Normal extraocular motion.      Conjunctiva/sclera: Conjunctivae normal.     Neurological:      Mental Status: She is alert.       Assessment & Plan:   Jacquie  is a pleasant 23 y.o. female with the following -     Problem List Items Addressed This Visit       Shift work sleep disorder (Chronic)    Relevant Medications    traZODone (DESYREL) 50 MG Tab    Hordeolum externum (stye)    Relevant Medications    erythromycin 5 MG/GM Ointment    Need for vaccination    Relevant Orders    INFLUENZA VACCINE QUAD INJ (PF)     Return if symptoms worsen or fail to improve.    Please note that this dictation was created using voice recognition software. I have made every reasonable attempt to correct obvious errors, but I expect that there are errors of grammar and possibly content that I did not discover before finalizing the note.        "

## 2024-06-22 ENCOUNTER — HOSPITAL ENCOUNTER (EMERGENCY)
Facility: MEDICAL CENTER | Age: 24
End: 2024-06-22
Attending: STUDENT IN AN ORGANIZED HEALTH CARE EDUCATION/TRAINING PROGRAM

## 2024-06-22 VITALS
WEIGHT: 160 LBS | DIASTOLIC BLOOD PRESSURE: 98 MMHG | OXYGEN SATURATION: 98 % | TEMPERATURE: 98.1 F | HEIGHT: 62 IN | RESPIRATION RATE: 18 BRPM | BODY MASS INDEX: 29.44 KG/M2 | SYSTOLIC BLOOD PRESSURE: 142 MMHG | HEART RATE: 127 BPM

## 2024-06-22 DIAGNOSIS — R00.0 TACHYCARDIA: ICD-10-CM
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
## 2024-06-22 PROCEDURE — 307740 HCHG GREEN TRAUMA TEAM SERVICES

## 2024-06-22 PROCEDURE — 99284 EMERGENCY DEPT VISIT MOD MDM: CPT
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
## 2024-06-22 NOTE — ED NOTES
"Pt discharged with NHP. Pt A&Ox4, on room air, steady gait. pt in possession of belongings. Pt provided discharge education and information pertaining to medications and follow up appointments. Pt received copy of discharge instructions and verbalized understanding. BP (!) 142/98   Pulse 127   Temp 36.7 °C (98.1 °F) (Temporal)   Resp 18   Ht 1.575 m (5' 2\")   Wt 72.6 kg (160 lb)   LMP 05/22/2024   SpO2 98%   BMI 29.26 kg/m²    "

## 2024-12-04 ENCOUNTER — TELEPHONE (OUTPATIENT)
Dept: HEALTH INFORMATION MANAGEMENT | Facility: OTHER | Age: 24
End: 2024-12-04
Payer: COMMERCIAL

## 2024-12-17 ENCOUNTER — OFFICE VISIT (OUTPATIENT)
Dept: MEDICAL GROUP | Age: 24
End: 2024-12-17
Payer: COMMERCIAL

## 2024-12-17 VITALS
HEART RATE: 103 BPM | TEMPERATURE: 96.8 F | HEIGHT: 62 IN | SYSTOLIC BLOOD PRESSURE: 104 MMHG | DIASTOLIC BLOOD PRESSURE: 68 MMHG | BODY MASS INDEX: 30.36 KG/M2 | WEIGHT: 165 LBS | OXYGEN SATURATION: 94 %

## 2024-12-17 DIAGNOSIS — R79.89 LOW TSH LEVEL: ICD-10-CM

## 2024-12-17 DIAGNOSIS — F41.9 ANXIETY AND DEPRESSION: Chronic | ICD-10-CM

## 2024-12-17 DIAGNOSIS — F32.1 MODERATE SINGLE CURRENT EPISODE OF MAJOR DEPRESSIVE DISORDER (HCC): ICD-10-CM

## 2024-12-17 DIAGNOSIS — F41.0 PANIC ATTACK: ICD-10-CM

## 2024-12-17 DIAGNOSIS — E55.9 VITAMIN D DEFICIENCY: ICD-10-CM

## 2024-12-17 DIAGNOSIS — Z13.220 LIPID SCREENING: ICD-10-CM

## 2024-12-17 DIAGNOSIS — F32.A ANXIETY AND DEPRESSION: Chronic | ICD-10-CM

## 2024-12-17 DIAGNOSIS — Z00.00 HEALTHCARE MAINTENANCE: ICD-10-CM

## 2024-12-17 PROCEDURE — 3078F DIAST BP <80 MM HG: CPT | Performed by: INTERNAL MEDICINE

## 2024-12-17 PROCEDURE — 3074F SYST BP LT 130 MM HG: CPT | Performed by: INTERNAL MEDICINE

## 2024-12-17 PROCEDURE — 99214 OFFICE O/P EST MOD 30 MIN: CPT | Performed by: INTERNAL MEDICINE

## 2024-12-17 RX ORDER — PROPRANOLOL HCL 20 MG
20 TABLET ORAL 3 TIMES DAILY PRN
Qty: 90 TABLET | Refills: 3 | Status: SHIPPED | OUTPATIENT
Start: 2024-12-17

## 2024-12-17 RX ORDER — VENLAFAXINE HYDROCHLORIDE 37.5 MG/1
CAPSULE, EXTENDED RELEASE ORAL
Qty: 152 CAPSULE | Refills: 1 | Status: SHIPPED | OUTPATIENT
Start: 2024-12-17 | End: 2025-03-17

## 2024-12-17 ASSESSMENT — ENCOUNTER SYMPTOMS
HALLUCINATIONS: 0
NERVOUS/ANXIOUS: 1
DEPRESSION: 1

## 2024-12-17 ASSESSMENT — PATIENT HEALTH QUESTIONNAIRE - PHQ9
CLINICAL INTERPRETATION OF PHQ2 SCORE: 4
5. POOR APPETITE OR OVEREATING: 0 - NOT AT ALL

## 2024-12-17 ASSESSMENT — LIFESTYLE VARIABLES: SUBSTANCE_ABUSE: 0
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
## 2024-12-19 ENCOUNTER — HOSPITAL ENCOUNTER (OUTPATIENT)
Dept: LAB | Facility: MEDICAL CENTER | Age: 24
End: 2024-12-19
Attending: INTERNAL MEDICINE
Payer: COMMERCIAL

## 2024-12-19 DIAGNOSIS — Z13.220 LIPID SCREENING: ICD-10-CM

## 2024-12-19 DIAGNOSIS — R79.89 LOW TSH LEVEL: ICD-10-CM

## 2024-12-19 DIAGNOSIS — Z00.00 HEALTHCARE MAINTENANCE: ICD-10-CM

## 2024-12-19 DIAGNOSIS — E55.9 VITAMIN D DEFICIENCY: ICD-10-CM

## 2024-12-19 LAB
25(OH)D3 SERPL-MCNC: 18 NG/ML (ref 30–100)
ALBUMIN SERPL BCP-MCNC: 4.4 G/DL (ref 3.2–4.9)
ALBUMIN/GLOB SERPL: 1.5 G/DL
ALP SERPL-CCNC: 82 U/L (ref 30–99)
ALT SERPL-CCNC: 29 U/L (ref 2–50)
ANION GAP SERPL CALC-SCNC: 10 MMOL/L (ref 7–16)
AST SERPL-CCNC: 32 U/L (ref 12–45)
BASOPHILS # BLD AUTO: 0.8 % (ref 0–1.8)
BASOPHILS # BLD: 0.04 K/UL (ref 0–0.12)
BILIRUB SERPL-MCNC: 0.3 MG/DL (ref 0.1–1.5)
BUN SERPL-MCNC: 8 MG/DL (ref 8–22)
CALCIUM ALBUM COR SERPL-MCNC: 8.6 MG/DL (ref 8.5–10.5)
CALCIUM SERPL-MCNC: 8.9 MG/DL (ref 8.5–10.5)
CHLORIDE SERPL-SCNC: 106 MMOL/L (ref 96–112)
CHOLEST SERPL-MCNC: 148 MG/DL (ref 100–199)
CO2 SERPL-SCNC: 23 MMOL/L (ref 20–33)
CREAT SERPL-MCNC: 0.6 MG/DL (ref 0.5–1.4)
EOSINOPHIL # BLD AUTO: 0.26 K/UL (ref 0–0.51)
EOSINOPHIL NFR BLD: 5 % (ref 0–6.9)
ERYTHROCYTE [DISTWIDTH] IN BLOOD BY AUTOMATED COUNT: 47.9 FL (ref 35.9–50)
GFR SERPLBLD CREATININE-BSD FMLA CKD-EPI: 128 ML/MIN/1.73 M 2
GLOBULIN SER CALC-MCNC: 3 G/DL (ref 1.9–3.5)
GLUCOSE SERPL-MCNC: 88 MG/DL (ref 65–99)
HCT VFR BLD AUTO: 43.7 % (ref 37–47)
HDLC SERPL-MCNC: 65 MG/DL
HGB BLD-MCNC: 13.8 G/DL (ref 12–16)
IMM GRANULOCYTES # BLD AUTO: 0.01 K/UL (ref 0–0.11)
IMM GRANULOCYTES NFR BLD AUTO: 0.2 % (ref 0–0.9)
LDLC SERPL CALC-MCNC: 72 MG/DL
LYMPHOCYTES # BLD AUTO: 1.38 K/UL (ref 1–4.8)
LYMPHOCYTES NFR BLD: 26.6 % (ref 22–41)
MCH RBC QN AUTO: 30.9 PG (ref 27–33)
MCHC RBC AUTO-ENTMCNC: 31.6 G/DL (ref 32.2–35.5)
MCV RBC AUTO: 97.8 FL (ref 81.4–97.8)
MONOCYTES # BLD AUTO: 0.56 K/UL (ref 0–0.85)
MONOCYTES NFR BLD AUTO: 10.8 % (ref 0–13.4)
NEUTROPHILS # BLD AUTO: 2.93 K/UL (ref 1.82–7.42)
NEUTROPHILS NFR BLD: 56.6 % (ref 44–72)
NRBC # BLD AUTO: 0 K/UL
NRBC BLD-RTO: 0 /100 WBC (ref 0–0.2)
PLATELET # BLD AUTO: 269 K/UL (ref 164–446)
PMV BLD AUTO: 11 FL (ref 9–12.9)
POTASSIUM SERPL-SCNC: 4.5 MMOL/L (ref 3.6–5.5)
PROT SERPL-MCNC: 7.4 G/DL (ref 6–8.2)
RBC # BLD AUTO: 4.47 M/UL (ref 4.2–5.4)
SODIUM SERPL-SCNC: 139 MMOL/L (ref 135–145)
TRIGL SERPL-MCNC: 54 MG/DL (ref 0–149)
TSH SERPL DL<=0.005 MIU/L-ACNC: 0.36 UIU/ML (ref 0.38–5.33)
WBC # BLD AUTO: 5.2 K/UL (ref 4.8–10.8)

## 2024-12-19 PROCEDURE — 84443 ASSAY THYROID STIM HORMONE: CPT

## 2024-12-19 PROCEDURE — 85025 COMPLETE CBC W/AUTO DIFF WBC: CPT

## 2024-12-19 PROCEDURE — 82306 VITAMIN D 25 HYDROXY: CPT

## 2024-12-19 PROCEDURE — 36415 COLL VENOUS BLD VENIPUNCTURE: CPT

## 2024-12-19 PROCEDURE — 84439 ASSAY OF FREE THYROXINE: CPT

## 2024-12-19 PROCEDURE — 80061 LIPID PANEL: CPT

## 2024-12-19 PROCEDURE — 80053 COMPREHEN METABOLIC PANEL: CPT

## 2024-12-20 LAB — T4 FREE SERPL-MCNC: 1.01 NG/DL (ref 0.93–1.7)

## 2024-12-24 ENCOUNTER — PATIENT MESSAGE (OUTPATIENT)
Dept: MEDICAL GROUP | Age: 24
End: 2024-12-24
Payer: COMMERCIAL

## 2024-12-24 DIAGNOSIS — E05.90 SUBCLINICAL HYPERTHYROIDISM: ICD-10-CM

## 2025-01-21 ENCOUNTER — PATIENT MESSAGE (OUTPATIENT)
Dept: MEDICAL GROUP | Age: 25
End: 2025-01-21
Payer: COMMERCIAL

## 2025-01-21 DIAGNOSIS — F32.A ANXIETY AND DEPRESSION: Chronic | ICD-10-CM

## 2025-01-21 DIAGNOSIS — F41.9 ANXIETY AND DEPRESSION: Chronic | ICD-10-CM

## 2025-01-22 RX ORDER — VENLAFAXINE HYDROCHLORIDE 75 MG/1
75 CAPSULE, EXTENDED RELEASE ORAL DAILY
Qty: 90 CAPSULE | Refills: 1 | Status: SHIPPED | OUTPATIENT
Start: 2025-01-22

## 2025-02-04 ENCOUNTER — APPOINTMENT (OUTPATIENT)
Dept: MEDICAL GROUP | Age: 25
End: 2025-02-04
Payer: COMMERCIAL

## 2025-02-17 NOTE — PROGRESS NOTES
New Patient Consult Note for Endocrinology  Referred by: Tomasa Muhammad M.D.    Reason for consult:   Hyperthyroidism    HPI:  Jacquie Friedman is a 24 y.o. female who was referred for endocrinology service for hyperthyroidism evaluation and management.     Hyperthyroidism:  - noted intermittent TSH suppression on TFTs  - admits anxiety, sweating, denies weight loss, palpitations, diarrhea  - denies neck discomfort, pain, dysphagia, dyspnea  - FHx of thyroid disease: none  - denies biotin intake, iodine supplementations, recent contrast use, pregnancy  - denies history of neck tenderness    Past Medical History:  Patient Active Problem List    Diagnosis Date Noted    Hordeolum externum (stye) 11/28/2023    Shift work sleep disorder 11/28/2023    Administrative encounter 09/13/2023    Need for vaccination 09/20/2022    Low TSH level 04/01/2021    Anxiety and depression 10/22/2019    Seasonal allergies 08/09/2019    Reactive airway disease 08/09/2019    Nasal polyp 08/09/2019    Migraine without aura and without status migrainosus, not intractable 03/26/2018    Irregular sleep-wake rhythm, nonorganic origin 12/18/2017    PRISCILA (generalized anxiety disorder) 12/18/2017    Sleep paralysis 09/01/2017    Moderate single current episode of major depressive disorder (HCC) 05/26/2017    Menorrhagia with irregular cycle 01/13/2016     Past Surgical History:  No past surgical history on file.    Allergies:  Chocolate and Chocolate concentrate [chlorhexidine]    Social History:  Social History     Socioeconomic History    Marital status: Single     Spouse name: Not on file    Number of children: Not on file    Years of education: Not on file    Highest education level: Not on file   Occupational History    Not on file   Tobacco Use    Smoking status: Never    Smokeless tobacco: Never   Vaping Use    Vaping status: Never Used   Substance and Sexual Activity    Alcohol use: Yes     Comment: occ    Drug use: Never     Sexual activity: Never   Other Topics Concern    Behavioral problems Yes    Interpersonal relationships Not Asked    Sad or not enjoying activities Yes    Suicidal thoughts Not Asked    Poor school performance Not Asked    Reading difficulties Not Asked    Speech difficulties Not Asked    Writing difficulties Not Asked    Inadequate sleep Yes    Excessive TV viewing Not Asked    Excessive video game use Not Asked    Inadequate exercise Not Asked    Sports related Not Asked    Poor diet Not Asked    Family concerns for drug/alcohol abuse Not Asked    Poor oral hygiene Not Asked    Bike safety Not Asked    Family concerns vehicle safety Not Asked   Social History Narrative    ** Merged History Encounter **          Social Drivers of Health     Financial Resource Strain: Not on file   Food Insecurity: Not on file   Transportation Needs: Not on file   Physical Activity: Not on file   Stress: Not on file   Social Connections: Not on file   Intimate Partner Violence: Not on file   Housing Stability: Not on file     Family History:  Family History   Problem Relation Age of Onset    GI Disease Mother         Gallbladder    Allergies Father     Allergies Brother     Diabetes Maternal Grandmother      Medications:  Current Outpatient Medications:     venlafaxine XR (EFFEXOR XR) 75 MG CAPSULE SR 24 HR, Take 1 Capsule by mouth every day., Disp: 90 Capsule, Rfl: 1    propranolol (INDERAL) 20 MG Tab, Take 1 Tablet by mouth 3 times a day as needed (anxiety/panic)., Disp: 90 Tablet, Rfl: 3    erythromycin 5 MG/GM Ointment, Apply 1 Application to left eye 2 times a day. (Patient not taking: Reported on 12/17/2024), Disp: 3.5 g, Rfl: 1    traZODone (DESYREL) 50 MG Tab, Take 0.5-2 Tablets by mouth every evening., Disp: 90 Tablet, Rfl: 3    azelastine (OPTIVAR) 0.05 % ophthalmic solution, Administer 1 Drop into both eyes 2 times a day., Disp: 6 mL, Rfl: 1    albuterol 108 (90 Base) MCG/ACT Aero Soln inhalation aerosol, Inhale 2 Puffs  "every four hours as needed for Shortness of Breath., Disp: 1 Each, Rfl: 1    Physical Examination:   Vital signs: /76   Pulse 85   Ht 1.575 m (5' 2\") Comment: pt stated  Wt 74.7 kg (164 lb 11.2 oz)   SpO2 97%   BMI 30.12 kg/m²   General: No distress, cooperative, well dressed and well nourished.   Eyes: No scleral icterus or discharge  ENMT: Normal on external inspection of nose, lips, No nasal drainage   Neck: No abnormal masses on inspection  Resp: Normal effort  CVS: Regular rate and rhythm  Extremities: No edema bilateral extremities  Neuro: Alert and oriented  Skin: No rash, No Ulcers  Psych: Normal mood and affect    Labs:  - reviewed   Reference Range  08/16/18  10/28/19  03/23/21  04/01/21  05/17/22  12/19/24    TSH 0.380 - 5.330  0.980 0.410 0.320 (L) 0.470 0.520 0.358 (L)   fT4 0.93 - 1.70 ng/dL 0.68  1.10 1.06 1.06 1.01   fT3 2.00 - 4.40 pg/mL    3.27       Assessment and Plan:  # Subclinical hyperthyroidism  - noted on 2 occasions: 3/20/2021 and 12/20/2024, with only mildly suppressed TSH and normal T4/T3  - patient denies biotin intake, contrast use, iodine supplementation, levothyroxine use, history of thyroid tenderness, pregnancy  - the degree of TSH suppression is not concerning but deserve further evaluation  - will repeat TFTs now + Graves' disease markers  - on physical exam thyroid gland is unremarkable, no palpable nodules, hold on thyroid ultrasound  - differential diagnosis: transient thyroiditis vs hashitoxicosis vs functional autonomy vs Graves' disease  Plan:  Overall reassured the patient.  Repeat TFTs plus TRAb/TSI.  Further evaluation and management based on most current labs    RTC: 1 mo    Total time (face-to-face and non-face-to face time): 45 min - discussion of diagnoses, treatment, prognosis, medical charts, lab review, documentation.    Plan reviewed with the patient and agreed with plan.  All questions answered to patient's satisfaction.  Thank you kindly for allowing " me to participate in the care plan for this patient.    Shawna Gray MD    CC:   Tomasa Muhammad M.D.

## 2025-02-20 ENCOUNTER — OFFICE VISIT (OUTPATIENT)
Dept: ENDOCRINOLOGY | Facility: MEDICAL CENTER | Age: 25
End: 2025-02-20
Attending: STUDENT IN AN ORGANIZED HEALTH CARE EDUCATION/TRAINING PROGRAM
Payer: COMMERCIAL

## 2025-02-20 VITALS
SYSTOLIC BLOOD PRESSURE: 118 MMHG | BODY MASS INDEX: 30.31 KG/M2 | HEIGHT: 62 IN | DIASTOLIC BLOOD PRESSURE: 76 MMHG | HEART RATE: 85 BPM | WEIGHT: 164.7 LBS | OXYGEN SATURATION: 97 %

## 2025-02-20 DIAGNOSIS — E05.90 SUBCLINICAL HYPERTHYROIDISM: ICD-10-CM

## 2025-02-20 PROCEDURE — 99211 OFF/OP EST MAY X REQ PHY/QHP: CPT | Performed by: STUDENT IN AN ORGANIZED HEALTH CARE EDUCATION/TRAINING PROGRAM

## 2025-02-20 ASSESSMENT — FIBROSIS 4 INDEX: FIB4 SCORE: 0.53

## 2025-03-18 ENCOUNTER — TELEPHONE (OUTPATIENT)
Dept: ENDOCRINOLOGY | Facility: MEDICAL CENTER | Age: 25
End: 2025-03-18
Payer: COMMERCIAL

## 2025-03-18 NOTE — TELEPHONE ENCOUNTER
Called Pt and left a voicemail reminding them of their appt on 03/26/25 and that they have labs due prior to their appt. I left a call back number in case they had any questions or concerns.

## 2025-03-24 ENCOUNTER — HOSPITAL ENCOUNTER (OUTPATIENT)
Dept: LAB | Facility: MEDICAL CENTER | Age: 25
End: 2025-03-24
Attending: STUDENT IN AN ORGANIZED HEALTH CARE EDUCATION/TRAINING PROGRAM
Payer: COMMERCIAL

## 2025-03-24 DIAGNOSIS — E05.90 SUBCLINICAL HYPERTHYROIDISM: ICD-10-CM

## 2025-03-24 LAB
T3 SERPL-MCNC: 148 NG/DL (ref 60–181)
T4 FREE SERPL-MCNC: 0.93 NG/DL (ref 0.93–1.7)
TSH SERPL-ACNC: 0.58 UIU/ML (ref 0.35–5.5)

## 2025-03-24 PROCEDURE — 84439 ASSAY OF FREE THYROXINE: CPT

## 2025-03-24 PROCEDURE — 36415 COLL VENOUS BLD VENIPUNCTURE: CPT

## 2025-03-24 PROCEDURE — 84480 ASSAY TRIIODOTHYRONINE (T3): CPT

## 2025-03-24 PROCEDURE — 83520 IMMUNOASSAY QUANT NOS NONAB: CPT

## 2025-03-24 PROCEDURE — 84443 ASSAY THYROID STIM HORMONE: CPT

## 2025-03-24 PROCEDURE — 84445 ASSAY OF TSI GLOBULIN: CPT

## 2025-03-25 ENCOUNTER — TELEPHONE (OUTPATIENT)
Dept: ENDOCRINOLOGY | Facility: MEDICAL CENTER | Age: 25
End: 2025-03-25
Payer: COMMERCIAL

## 2025-03-25 DIAGNOSIS — E05.90 SUBCLINICAL HYPERTHYROIDISM: ICD-10-CM

## 2025-03-25 NOTE — TELEPHONE ENCOUNTER
Spoke with pt regarding appt for tomorrow. Appt is not needed since labs are normal. Pt will do labs in 3 months and call back to schedule 3 month follow up with Dr. Gray. Did advise pt to call back ASAP to get scheduled for follow up since Dr. ESPINOZA is booking in July currently.

## 2025-03-25 NOTE — PROGRESS NOTES
Follow up Endocrinology Visit  Initial consult/last visit on: 2/20/25  Referred by: Tomasa Muhammad M.D.    Chief complaint:  Jacquie Friedman, 24 y.o., female, who is here for follow up for subclinical hyperthyroidism evaluation    Interval history:   - since last visit    Hyperthyroidism:  - noted intermittent TSH suppression on TFTs  - admits anxiety, sweating, denies weight loss, palpitations, diarrhea  - denies neck discomfort, pain, dysphagia, dyspnea  - FHx of thyroid disease: none  - denies biotin intake, iodine supplementations, recent contrast use, pregnancy  - denies history of neck tenderness    Medications:  Current Outpatient Medications:     venlafaxine XR (EFFEXOR XR) 75 MG CAPSULE SR 24 HR, Take 1 Capsule by mouth every day., Disp: 90 Capsule, Rfl: 1    propranolol (INDERAL) 20 MG Tab, Take 1 Tablet by mouth 3 times a day as needed (anxiety/panic)., Disp: 90 Tablet, Rfl: 3    erythromycin 5 MG/GM Ointment, Apply 1 Application to left eye 2 times a day. (Patient not taking: Reported on 2/20/2025), Disp: 3.5 g, Rfl: 1    traZODone (DESYREL) 50 MG Tab, Take 0.5-2 Tablets by mouth every evening., Disp: 90 Tablet, Rfl: 3    azelastine (OPTIVAR) 0.05 % ophthalmic solution, Administer 1 Drop into both eyes 2 times a day., Disp: 6 mL, Rfl: 1    albuterol 108 (90 Base) MCG/ACT Aero Soln inhalation aerosol, Inhale 2 Puffs every four hours as needed for Shortness of Breath., Disp: 1 Each, Rfl: 1    Physical Examination:   Vital signs: There were no vitals taken for this visit.  General: No distress, cooperative, well dressed and well nourished.   Eyes: No scleral icterus or discharge  ENMT: Normal on external inspection of nose, lips, No nasal drainage   Neck: No abnormal masses on inspection  Resp: Normal effort  CVS: Regular rate and rhythm  Extremities: No edema bilateral extremities  Neuro: Alert and oriented  Skin: No rash, No Ulcers  Psych: Normal mood and affect    Labs:  - reviewed    Reference Range  08/16/18  10/28/19  03/23/21  04/01/21  05/17/22  12/19/24  3/24/25   TSH 0.380 - 5.330  0.980 0.410 0.320 (L) 0.470 0.520 0.358 (L) 0.579   fT4 0.93 - 1.70 ng/dL 0.68  1.10 1.06 1.06 1.01 0.93   fT3 2.00 - 4.40 pg/mL    3.27      TT3 60.0 - 181.0 ng/dL       148.0   TSI        pending   TrAb        pending     Assessment and Plan:  # Subclinical hyperthyroidism  -   Prior notes:  2/20/25  - noted on 2 occasions: 3/20/2021 and 12/20/2024, with only mildly suppressed TSH and normal T4/T3  - patient denies biotin intake, contrast use, iodine supplementation, levothyroxine use, history of thyroid tenderness, pregnancy  - the degree of TSH suppression is not concerning but deserve further evaluation  - will repeat TFTs now + Graves' disease markers  - on physical exam thyroid gland is unremarkable, no palpable nodules, hold on thyroid ultrasound  - differential diagnosis: transient thyroiditis vs hashitoxicosis vs functional autonomy vs Graves' disease  Plan:  Overall reassured the patient.  Repeat TFTs plus TRAb/TSI.  Further evaluation and management based on most current labs    RTC: 1 mo    Total time (face-to-face and non-face-to face time): 45 min - discussion of diagnoses, treatment, prognosis, medical charts, lab review, documentation.    Plan reviewed with the patient and agreed with plan.  All questions answered to patient's satisfaction.  Thank you kindly for allowing me to participate in the care plan for this patient.    Shawna Gray MD    CC:   Tomasa Muhammad M.D.

## 2025-03-26 ENCOUNTER — APPOINTMENT (OUTPATIENT)
Dept: ENDOCRINOLOGY | Facility: MEDICAL CENTER | Age: 25
End: 2025-03-26
Attending: STUDENT IN AN ORGANIZED HEALTH CARE EDUCATION/TRAINING PROGRAM
Payer: COMMERCIAL

## 2025-03-26 LAB — TSH RECEP AB SER-ACNC: <1.1 IU/L

## 2025-03-27 LAB — TSI SER-ACNC: <0.1 IU/L
